# Patient Record
Sex: FEMALE | Race: WHITE | NOT HISPANIC OR LATINO | Employment: OTHER | URBAN - METROPOLITAN AREA
[De-identification: names, ages, dates, MRNs, and addresses within clinical notes are randomized per-mention and may not be internally consistent; named-entity substitution may affect disease eponyms.]

---

## 2017-02-24 ENCOUNTER — TRANSCRIBE ORDERS (OUTPATIENT)
Dept: GYNECOLOGIC ONCOLOGY | Facility: CLINIC | Age: 72
End: 2017-02-24

## 2017-02-24 DIAGNOSIS — Z12.31 VISIT FOR SCREENING MAMMOGRAM: Primary | ICD-10-CM

## 2017-02-28 ENCOUNTER — HOSPITAL ENCOUNTER (OUTPATIENT)
Dept: MAMMOGRAPHY | Facility: HOSPITAL | Age: 72
Discharge: HOME | End: 2017-02-28
Attending: OBSTETRICS & GYNECOLOGY | Admitting: OBSTETRICS & GYNECOLOGY

## 2017-02-28 DIAGNOSIS — Z12.31 VISIT FOR SCREENING MAMMOGRAM: ICD-10-CM

## 2017-02-28 PROCEDURE — 77063 BREAST TOMOSYNTHESIS BI: CPT

## 2017-02-28 PROCEDURE — G0202 SCR MAMMO BI INCL CAD: HCPCS

## 2017-02-28 PROCEDURE — G0202 SCR MAMMO BI INCL CAD: HCPCS | Performed by: RADIOLOGY

## 2017-02-28 PROCEDURE — 77063 BREAST TOMOSYNTHESIS BI: CPT | Performed by: RADIOLOGY

## 2017-07-12 ENCOUNTER — OFFICE VISIT (OUTPATIENT)
Dept: GYNECOLOGIC ONCOLOGY | Facility: CLINIC | Age: 72
End: 2017-07-12

## 2017-07-12 VITALS
SYSTOLIC BLOOD PRESSURE: 118 MMHG | RESPIRATION RATE: 18 BRPM | HEIGHT: 67 IN | DIASTOLIC BLOOD PRESSURE: 77 MMHG | WEIGHT: 211 LBS | OXYGEN SATURATION: 95 % | TEMPERATURE: 99.2 F | HEART RATE: 97 BPM | BODY MASS INDEX: 33.12 KG/M2

## 2017-07-12 DIAGNOSIS — Z80.3 FAMILY HISTORY OF BREAST CANCER: ICD-10-CM

## 2017-07-12 DIAGNOSIS — Z01.419 WELL FEMALE EXAM WITH ROUTINE GYNECOLOGICAL EXAM: Primary | ICD-10-CM

## 2017-07-12 DIAGNOSIS — N95.1 MENOPAUSAL STATE: ICD-10-CM

## 2017-07-12 DIAGNOSIS — Z15.01 GENETIC SUSCEPTIBILITY TO MALIGNANT NEOPLASM OF BREAST: ICD-10-CM

## 2017-07-12 PROCEDURE — 99397 PER PM REEVAL EST PAT 65+ YR: CPT | Performed by: NURSE PRACTITIONER

## 2017-07-12 RX ORDER — CELECOXIB 200 MG/1
CAPSULE ORAL
Refills: 3 | COMMUNITY
Start: 2017-05-08 | End: 2023-01-19

## 2017-07-12 RX ORDER — CYCLOBENZAPRINE HCL 10 MG
10 TABLET ORAL 3 TIMES DAILY PRN
Refills: 3 | COMMUNITY
Start: 2017-06-22

## 2017-07-12 NOTE — PROGRESS NOTES
GYN ONCOLOGY ANNUAL WELL WOMAN VISIT      Blanca Sparrow  [id number]  1945      Chief Complaint: Gynecologic Exam (here with no complaints)        History of present illness:  Blanca Sparrow is a 71 y.o. year old female who is here today for an annual exam. She has a strong family history of breast cancer with a lifetime risk of developing breast cancer estimated to be 26%. She is feeling well today. Her bowels and bladder are working well. She has had no vaginal bleeding or pelvic pain. She is completing monthly self breast exams and has no new breast complaints today. Her current breast screening regimen includes yearly mammograms done 2017 and yearly screening breast MRI done 2016.  She would like to have repeat screening MRI done this month.  She denies changes and personal or family health history.  Her colonoscopy was done 4-5 years ago with recommendation for 10 year follow-up.  She had bone density done 2016 that showed normal bone density.  She continues to take daily supplement calcium and vitamin D.  She is still taking Evista, has been on this for now 9 years.  She notes that her primary care physician plans to discuss the option of stopping this medication at her next visit.    Cancer History:    No history exists.       Obstetric History:  OB History      Para Term  AB TAB SAB Ectopic Multiple Living    1 1 1                Menstrual History:     No LMP recorded. Patient has had a hysterectomy.          Past Medical History:   Diagnosis Date   • Arthritis    • Hypertension        Past Surgical History:   Procedure Laterality Date   • ACHILLES TENDON SURGERY     • BREAST BIOPSY Right     BENIGN CORE BX, PT UNSURE OF DATE   • HYSTERECTOMY     • OTHER SURGICAL HISTORY      APPEY   • SKIN CANCER EXCISION     • TONSILLECTOMY     • TOTAL HIP ARTHROPLASTY REVISION Right    • TOTAL KNEE ARTHROPLASTY Left        MEDICATIONS: The current medication list was reviewed and  "reconciled.     Allergies:  has No Known Allergies.    Family History   Problem Relation Age of Onset   • Heart disease Mother    • Osteoarthritis Mother    • Breast cancer Mother 50   • Heart disease Father    • Diabetes Father    • Stroke Father    • Breast cancer Sister 39   • Ovarian cancer Maternal Grandmother        Health Maintenance:  Last mammogram was 2/28/2017. Last colonoscopy was 4-5 years ago, with recommended follow-up in 10 year(s). Last DEXA was 2016. Last pap smear was 2016, results were  normal PAP..Screening breast MRI 7/2016.     Review of Systems   Constitutional: Negative for fatigue, fever and unexpected weight change.   HENT: Negative for congestion, ear pain, hearing loss, sinus pressure and trouble swallowing.    Eyes: Negative for visual disturbance.   Respiratory: Negative for cough, chest tightness, shortness of breath and wheezing.    Cardiovascular: Negative for chest pain, palpitations and leg swelling.   Gastrointestinal: Negative for abdominal distention, abdominal pain, constipation, diarrhea, nausea and vomiting.   Endocrine: Negative for cold intolerance, heat intolerance, polydipsia, polyphagia and polyuria.   Genitourinary: Negative for difficulty urinating, dyspareunia, dysuria, frequency, hematuria, pelvic pain, urgency, vaginal bleeding, vaginal discharge and vaginal pain.   Musculoskeletal: Negative for arthralgias, gait problem, joint swelling and myalgias.   Skin: Negative for color change, pallor and rash.   Neurological: Negative for dizziness, seizures, syncope, weakness, light-headedness, numbness and headaches.   Hematological: Negative for adenopathy. Does not bruise/bleed easily.   Psychiatric/Behavioral: Negative for agitation, confusion, sleep disturbance and suicidal ideas. The patient is not nervous/anxious.        Physical Exam  Vital Signs: /77  Pulse 97  Temp 99.2 °F (37.3 °C) (Temporal Artery )   Resp 18  Ht 66.5\" (168.9 cm)  Wt 211 lb (95.7 kg) "  SpO2 95%  BMI 33.55 kg/m2   General Appearance:  alert, cooperative, no apparent distress and appears stated age   Neurologic/Psychiatric: A&O x 3, gait steady, appropriate affect   HEENT:  Normocephalic, without obvious abnormality, mucous membranes moist   Neck: Supple, symmetrical, trachea midline, no adenopathy;  No thyromegaly, masses, or tenderness   Back:   Symmetric, no curvature, ROM normal, no CVA tenderness   Lungs:   Clear to auscultation bilaterally; respirations regular, even, and unlabored bilaterally   Heart:  Regular rate and rhythm, no murmurs appreciated   Breasts:  Symmetrical, no masses, no lesions and no nipple discharge   Abdomen:   Soft, non-tender, non-distended and no organomegaly   Lymph nodes: No cervical, supraclavicular, inguinal or axillary adenopathy noted   Extremities: Normal, atraumatic; no clubbing, cyanosis, or edema    Skin: No rashes, ulcers, or suspicious lesions noted   Pelvic: External Genitalia  without lesions or skin changes  Vagina  is pale, atrophic.   Vaginal Cuff  Female Vaginal Cuff: smooth, intact and without visible lesions  Uterus  surgically absent  Ovaries  surgically absent bilaterallly and without palpable masses or fullness  Parametria  smooth  Rectovaginal  Female rectovaginal: confirms no masses or bleeding and Hemoccult negative     ECOG Performance Status: 0 - Asymptomatic    Procedure Note:  No notes on file    Assessment and Plan:    Blanca was seen today for gynecologic exam.    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  The patient was instructed to call with vaginal bleeding, discharge, pelvic pain, change in bowel or bladder  function, or any new symptoms for evaluation of her complaints.   The pt will continue her Evista prescribed by her PCP. We discussed that this decreases the risk of breast cancer by up to 50% when taken for 5 years. The patient isn't on her Evista for 9+ years.  We discussed option of stopping medication.  I told her there is no data to support using this medication for risk reduction past 5 years, however there is some new evidence to show the benefit of hormone blockade in women who have had a previous breast cancer diagnosis.  She is going to discuss option further with her primary care doctor at her next visit.  Bone density done 7/2016 showing normal bone density. Pt instructed to continue calcium and vitamin D supplement daily. Repeat bone density in 2019.  Mamm due 2/2018.  Screening breast MRI due this month and ordered at her visit today.   Encouraged her to continue monthly self breast exams.     Return in about 1 year (around 7/12/2018) for Annual Exam.      BIRGIT Gaxiola      Note: Speech recognition transcription software was used to dictate portions of this document.  An attempt at proofreading has been made though minor errors in transcription may still be present.  Please do not hesitate to call our office with any questions.

## 2017-07-20 ENCOUNTER — HOSPITAL ENCOUNTER (OUTPATIENT)
Dept: MRI IMAGING | Facility: HOSPITAL | Age: 72
Discharge: HOME | End: 2017-07-20

## 2017-07-28 ENCOUNTER — HOSPITAL ENCOUNTER (OUTPATIENT)
Dept: MRI IMAGING | Facility: HOSPITAL | Age: 72
Discharge: HOME | End: 2017-07-28
Admitting: NURSE PRACTITIONER

## 2017-07-28 DIAGNOSIS — Z80.3 FAMILY HISTORY OF BREAST CANCER: ICD-10-CM

## 2017-07-28 DIAGNOSIS — Z15.01 GENETIC SUSCEPTIBILITY TO MALIGNANT NEOPLASM OF BREAST: ICD-10-CM

## 2017-07-28 PROCEDURE — 77059 MRI BREAST BILATERAL SCREENING W WO CONTRAST: CPT | Performed by: RADIOLOGY

## 2017-07-28 PROCEDURE — A9577 INJ MULTIHANCE: HCPCS | Performed by: NURSE PRACTITIONER

## 2017-07-28 PROCEDURE — C8908 MRI W/O FOL W/CONT, BREAST,: HCPCS

## 2017-07-28 PROCEDURE — 0159T PR BREAST MRI, COMPUTER AIDED DETECTION: CPT | Performed by: RADIOLOGY

## 2017-07-28 PROCEDURE — 0 GADOBENATE DIMEGLUMINE 529 MG/ML SOLUTION: Performed by: NURSE PRACTITIONER

## 2017-07-28 RX ADMIN — GADOBENATE DIMEGLUMINE 10 ML: 529 INJECTION, SOLUTION INTRAVENOUS at 09:30

## 2017-08-01 ENCOUNTER — TELEPHONE (OUTPATIENT)
Dept: MRI IMAGING | Facility: HOSPITAL | Age: 72
End: 2017-08-01

## 2018-03-09 ENCOUNTER — TRANSCRIBE ORDERS (OUTPATIENT)
Dept: ADMINISTRATIVE | Facility: HOSPITAL | Age: 73
End: 2018-03-09

## 2018-03-09 ENCOUNTER — HOSPITAL ENCOUNTER (OUTPATIENT)
Dept: GENERAL RADIOLOGY | Facility: HOSPITAL | Age: 73
Discharge: HOME | End: 2018-03-09
Admitting: PHYSICIAN ASSISTANT

## 2018-03-09 DIAGNOSIS — J40 BRONCHITIS: Primary | ICD-10-CM

## 2018-03-09 DIAGNOSIS — J04.0 LARYNGITIS: ICD-10-CM

## 2018-03-09 PROCEDURE — 71046 X-RAY EXAM CHEST 2 VIEWS: CPT

## 2018-03-14 ENCOUNTER — TRANSCRIBE ORDERS (OUTPATIENT)
Dept: ADMINISTRATIVE | Facility: HOSPITAL | Age: 73
End: 2018-03-14

## 2018-03-14 DIAGNOSIS — Z12.31 VISIT FOR SCREENING MAMMOGRAM: Primary | ICD-10-CM

## 2018-03-19 ENCOUNTER — TRANSCRIBE ORDERS (OUTPATIENT)
Dept: ADMINISTRATIVE | Facility: HOSPITAL | Age: 73
End: 2018-03-19

## 2018-03-19 DIAGNOSIS — R49.0 HOARSENESS OF VOICE: ICD-10-CM

## 2018-03-19 DIAGNOSIS — R93.89 ABNORMAL CHEST X-RAY: ICD-10-CM

## 2018-03-19 DIAGNOSIS — Z87.891 PERSONAL HISTORY OF TOBACCO USE, PRESENTING HAZARDS TO HEALTH: Primary | ICD-10-CM

## 2018-03-19 DIAGNOSIS — R05.3 CHRONIC COUGH: ICD-10-CM

## 2018-03-20 ENCOUNTER — HOSPITAL ENCOUNTER (OUTPATIENT)
Dept: MAMMOGRAPHY | Facility: HOSPITAL | Age: 73
Discharge: HOME | End: 2018-03-20
Attending: INTERNAL MEDICINE | Admitting: INTERNAL MEDICINE

## 2018-03-20 DIAGNOSIS — Z12.31 VISIT FOR SCREENING MAMMOGRAM: ICD-10-CM

## 2018-03-20 PROCEDURE — 77063 BREAST TOMOSYNTHESIS BI: CPT

## 2018-03-20 PROCEDURE — 77067 SCR MAMMO BI INCL CAD: CPT | Performed by: RADIOLOGY

## 2018-03-20 PROCEDURE — 77067 SCR MAMMO BI INCL CAD: CPT

## 2018-03-20 PROCEDURE — 77063 BREAST TOMOSYNTHESIS BI: CPT | Performed by: RADIOLOGY

## 2018-03-22 ENCOUNTER — HOSPITAL ENCOUNTER (OUTPATIENT)
Dept: CT IMAGING | Facility: HOSPITAL | Age: 73
Discharge: HOME | End: 2018-03-22
Attending: INTERNAL MEDICINE | Admitting: INTERNAL MEDICINE

## 2018-03-22 DIAGNOSIS — Z87.891 PERSONAL HISTORY OF TOBACCO USE, PRESENTING HAZARDS TO HEALTH: ICD-10-CM

## 2018-03-22 DIAGNOSIS — R05.3 CHRONIC COUGH: ICD-10-CM

## 2018-03-22 DIAGNOSIS — R49.0 HOARSENESS OF VOICE: ICD-10-CM

## 2018-03-22 DIAGNOSIS — R93.89 ABNORMAL CHEST X-RAY: ICD-10-CM

## 2018-03-22 LAB — CREAT BLDA-MCNC: 1.3 MG/DL (ref 0.6–1.3)

## 2018-03-22 PROCEDURE — 82565 ASSAY OF CREATININE: CPT

## 2018-03-22 PROCEDURE — 70491 CT SOFT TISSUE NECK W/DYE: CPT

## 2018-03-22 PROCEDURE — 71260 CT THORAX DX C+: CPT

## 2018-03-22 PROCEDURE — 25010000002 IOPAMIDOL 61 % SOLUTION: Performed by: INTERNAL MEDICINE

## 2018-03-22 RX ADMIN — IOPAMIDOL 70 ML: 612 INJECTION, SOLUTION INTRAVENOUS at 16:00

## 2018-03-30 ENCOUNTER — TRANSCRIBE ORDERS (OUTPATIENT)
Dept: ADMINISTRATIVE | Facility: HOSPITAL | Age: 73
End: 2018-03-30

## 2018-03-30 DIAGNOSIS — Z87.891 PERSONAL HISTORY OF TOBACCO USE, PRESENTING HAZARDS TO HEALTH: ICD-10-CM

## 2018-03-30 DIAGNOSIS — I71.20 THORACIC AORTIC ANEURYSM WITHOUT RUPTURE (HCC): Primary | ICD-10-CM

## 2018-04-02 ENCOUNTER — HOSPITAL ENCOUNTER (OUTPATIENT)
Dept: ULTRASOUND IMAGING | Facility: HOSPITAL | Age: 73
Discharge: HOME | End: 2018-04-02
Attending: INTERNAL MEDICINE | Admitting: INTERNAL MEDICINE

## 2018-04-02 DIAGNOSIS — I71.20 THORACIC AORTIC ANEURYSM WITHOUT RUPTURE (HCC): ICD-10-CM

## 2018-04-02 DIAGNOSIS — Z87.891 PERSONAL HISTORY OF TOBACCO USE, PRESENTING HAZARDS TO HEALTH: ICD-10-CM

## 2018-04-02 PROCEDURE — 76775 US EXAM ABDO BACK WALL LIM: CPT

## 2018-04-23 ENCOUNTER — OFFICE VISIT (OUTPATIENT)
Dept: CARDIAC SURGERY | Facility: CLINIC | Age: 73
End: 2018-04-23

## 2018-04-23 VITALS
DIASTOLIC BLOOD PRESSURE: 86 MMHG | BODY MASS INDEX: 35.03 KG/M2 | TEMPERATURE: 97.6 F | SYSTOLIC BLOOD PRESSURE: 119 MMHG | HEART RATE: 101 BPM | WEIGHT: 223.2 LBS | OXYGEN SATURATION: 98 % | HEIGHT: 67 IN

## 2018-04-23 DIAGNOSIS — I71.20 THORACIC AORTIC ANEURYSM WITHOUT RUPTURE (HCC): Primary | ICD-10-CM

## 2018-04-23 PROCEDURE — 99202 OFFICE O/P NEW SF 15 MIN: CPT | Performed by: THORACIC SURGERY (CARDIOTHORACIC VASCULAR SURGERY)

## 2018-04-23 RX ORDER — PRAVASTATIN SODIUM 40 MG
80 TABLET ORAL DAILY
COMMUNITY

## 2018-04-23 RX ORDER — RANITIDINE 300 MG/1
300 TABLET ORAL NIGHTLY
COMMUNITY
End: 2023-01-19

## 2018-04-23 NOTE — PROGRESS NOTES
04/23/2018  Patient Information  Blanca Sparrow                                                                                          [address] [place] [address]   1945  'PCP/Referring Physician'  Josiah Wagner MD  [phone]  Josiah Wagner MD  [phone]  Chief Complaint   Patient presents with   • Consult     New patient per Dr. Wagner for TAA. Patient has no complaints today    • Thoracic Aneurysm       History of Present Illness:  This patient was referred to me to evaluate a 4.2 cm ascending aortic aneurysm.  She has no known associated aortic valvular abnormalities.  She has had no recent back, chest and/or abdominal pain.  As far as she knows, she has not seen a cardiologist and had an echocardiogram to assess for aortic valve function.      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   • Family history of breast cancer   • Menopausal state   • Edema of left lower extremity   • Thoracic aortic aneurysm without rupture     Past Medical History:   Diagnosis Date   • Arthritis    • Hypertension      Past Surgical History:   Procedure Laterality Date   • ACHILLES TENDON SURGERY     • BREAST BIOPSY Right     BENIGN CORE BX, PT UNSURE OF DATE   • HYSTERECTOMY     • OOPHORECTOMY     • OTHER SURGICAL HISTORY      APPEY   • SKIN CANCER EXCISION     • TONSILLECTOMY     • TOTAL HIP ARTHROPLASTY REVISION Right    • TOTAL KNEE ARTHROPLASTY Left        Current Outpatient Prescriptions:   •  Calcium Carbonate-Vitamin D (CALCIUM + D PO), Take  by mouth daily., Disp: , Rfl:   •  celecoxib (CeleBREX) 200 MG capsule, TAKE 1 CAPSULE EVERY DAY, Disp: , Rfl: 3  •  Cholecalciferol (VITAMIN D-3 PO), Take 5,000 Units by mouth daily., Disp: , Rfl:   •  cyclobenzaprine (FLEXERIL) 10 MG tablet, TK 1 T PO TID, Disp: , Rfl: 3  •  montelukast (SINGULAIR) 10 MG tablet, Take 10 mg by mouth daily., Disp: ,  Rfl:   •  Olmesartan Medoxomil (BENICAR PO), Take  by mouth daily., Disp: , Rfl:   •  omeprazole (PriLOSEC) 40 MG capsule, Take 40 mg by mouth daily., Disp: , Rfl:   •  pravastatin (PRAVACHOL) 40 MG tablet, Take 40 mg by mouth Daily., Disp: , Rfl:   •  raloxifene (EVISTA) 60 MG tablet, Take 60 mg by mouth daily., Disp: , Rfl:   •  raNITIdine (ZANTAC) 300 MG tablet, Take 300 mg by mouth Every Night., Disp: , Rfl:   •  venlafaxine XR (EFFEXOR-XR) 150 MG 24 hr capsule, Take 150 mg by mouth daily., Disp: , Rfl:   •  zolpidem (AMBIEN) 10 MG tablet, Take 10 mg by mouth every night., Disp: , Rfl:   No Known Allergies  Social History     Social History   • Marital status:      Spouse name: N/A   • Number of children: N/A   • Years of education: N/A     Occupational History   • Not on file.     Social History Main Topics   • Smoking status: Former Smoker     Packs/day: 1.00     Types: Cigarettes     Start date: 1968     Quit date: 1983   • Smokeless tobacco: Never Used      Comment: Pt used nicoret since quit smoking    • Alcohol use No   • Drug use: No   • Sexual activity: No     Other Topics Concern   • Not on file     Social History Narrative   • No narrative on file     Family History   Problem Relation Age of Onset   • Heart disease Mother    • Osteoarthritis Mother    • Breast cancer Mother 50   • Heart disease Father    • Diabetes Father    • Stroke Father    • Breast cancer Sister 38   • Ovarian cancer Maternal Grandmother      ?     Review of Systems   Constitution: Positive for fever. Negative for chills, malaise/fatigue, night sweats and weight loss.   HENT: Negative for congestion, hearing loss, odynophagia and sore throat.    Cardiovascular: Positive for dyspnea on exertion. Negative for chest pain, leg swelling, orthopnea and palpitations.   Respiratory: Positive for cough (pt had bronchitis ) and shortness of breath. Negative for hemoptysis.    Endocrine: Negative for cold intolerance, heat intolerance,  "polydipsia, polyphagia and polyuria.   Hematologic/Lymphatic: Does not bruise/bleed easily.   Skin: Negative for itching and rash.   Musculoskeletal: Positive for joint pain (occasionally ). Negative for back pain, joint swelling, muscle cramps, muscle weakness, myalgias and neck pain.   Gastrointestinal: Negative for abdominal pain, constipation, diarrhea, hematemesis, hematochezia, melena, nausea and vomiting.   Genitourinary: Negative for dysuria, frequency and hematuria.   Neurological: Positive for light-headedness (when standing up fast ). Negative for dizziness, focal weakness, headaches, loss of balance, numbness, paresthesias and seizures.   Psychiatric/Behavioral: Negative for depression and suicidal ideas. The patient is not nervous/anxious.    Allergic/Immunologic: Negative for environmental allergies and persistent infections.   All other systems reviewed and are negative.    Vitals:    04/23/18 0719   BP: 119/86   BP Location: Right arm   Patient Position: Sitting   Pulse: 101   Temp: 97.6 °F (36.4 °C)   SpO2: 98%   Weight: 101 kg (223 lb 3.2 oz)   Height: 170.2 cm (67\")      Physical Exam   CONSTITUTIONAL: Alert and conversant, Well dressed, Well nourished, No acute distress  EYES: Sclera clean, Anicteric, Pupils equal  ENT: No nasal deviation, Trachea midline  NECK: No neck masses, Supple  LUNGS: No wheezing, Cough, non-congested  HEART: No rubs, No murmurs  GASTROINTESTINAL: Soft, non-distended, No masses, Non tender  to palpation, normal bowel sounds  NEURO: No motor deficits, No sensory deficits, Cranial Nerves 2 through 12 grossly intact  PSYCHIATRIC: Oriented to person, place and time, No memory deficits, Mood appropriate  VASCULAR: No carotid bruits, Femoral pulses palpable and symmetric  MUSKULOSKELETAL: No extremity trauma or extremity asymmetry    Labs/Imaging:   I have obtained and reviewed the medical records that include the CT scan demonstrating the 4.2 cm ascending " aorta.    Assessment/Plan:  This is a patient with a 4.2 cm ascending aortic aneurysm with no clinical evidence of aortic valvular abnormalities.  At this time, surgical intervention is only required at 5.5 cm or if there is associated valvular abnormalities.  I have discussed this with her in detail.  I have made arrangements for a CT scan to be done in one year for surveillance of this aneurysm. I advised her of the risk of contrast reaction and nephrotoxicity.  I have also recommended that she obtain an echocardiogram as a baseline and to assess for any associated aortic valvular abnormalities, which commonly occur with aneurysms of the ascending aorta.  I will leave this to Dr. Josiah Wagner's choice as to the cardiologist to send her for her echocardiogram and she states she will discuss that with him also.      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    04/23/2018    CC:   DANITA Burton, Medical Records, editing for Larry Stringer M.D.    ILarry MD, have read and agree with the editing done by Rubén Gil.

## 2018-08-08 ENCOUNTER — OFFICE VISIT (OUTPATIENT)
Dept: GYNECOLOGIC ONCOLOGY | Facility: CLINIC | Age: 73
End: 2018-08-08

## 2018-08-08 VITALS
HEIGHT: 67 IN | WEIGHT: 224 LBS | TEMPERATURE: 99.1 F | BODY MASS INDEX: 35.16 KG/M2 | RESPIRATION RATE: 20 BRPM | SYSTOLIC BLOOD PRESSURE: 145 MMHG | HEART RATE: 88 BPM | OXYGEN SATURATION: 95 % | DIASTOLIC BLOOD PRESSURE: 81 MMHG

## 2018-08-08 DIAGNOSIS — Z15.01 GENETIC SUSCEPTIBILITY TO MALIGNANT NEOPLASM OF BREAST: Primary | ICD-10-CM

## 2018-08-08 DIAGNOSIS — Z80.3 FAMILY HISTORY OF BREAST CANCER: ICD-10-CM

## 2018-08-08 PROCEDURE — 99213 OFFICE O/P EST LOW 20 MIN: CPT | Performed by: NURSE PRACTITIONER

## 2018-08-10 ENCOUNTER — TRANSCRIBE ORDERS (OUTPATIENT)
Dept: ADMINISTRATIVE | Facility: HOSPITAL | Age: 73
End: 2018-08-10

## 2018-08-10 DIAGNOSIS — I71.40 ABDOMINAL AORTIC ANEURYSM (AAA) WITHOUT RUPTURE (HCC): Primary | ICD-10-CM

## 2018-08-15 NOTE — PROGRESS NOTES
GYN ONCOLOGY HIGH RISK CLINIC    Blanca Sparrow  [id number]  1945    Chief Complaint:   Chief Complaint   Patient presents with   • Follow-up     here for high risk visit. no breast complaints        HISTORY OF PRESENT ILLNESS:    Blanca Sparrow is a 73 y.o. year old female here today for her high risk visit. She has a strong family history of breast cancer with a lifetime risk of developing breast cancer estimated to be 26%. She is completing monthly self breast exams and has no new breast complaints today. Her current breast screening regimen includes yearly mammograms done 3/20/2018 and yearly screening breast MRI done 7/28/2017. She is due to repeat high risk MRI now.     Since her last visit, patient was diagnosed with AAA. This was found incidentally upon CT for evaluation of an upper respiratory infection. She has been evaluated by cardiology and vascular surgery who feel area is small and q1 year imaging has been recommended. She will be seeing Dr. Stringer for her follow-ups. She is asymptomatic and feeling generally well.     Past Medical History:   Diagnosis Date   • Aortic aneurysm, abdominal (CMS/HCC)    • Arthritis    • Hypertension        Past Surgical History:   Procedure Laterality Date   • ACHILLES TENDON SURGERY     • BREAST BIOPSY Right     BENIGN CORE BX, PT UNSURE OF DATE   • HYSTERECTOMY     • OOPHORECTOMY     • OTHER SURGICAL HISTORY      APPEY   • SKIN CANCER EXCISION     • TONSILLECTOMY     • TOTAL HIP ARTHROPLASTY REVISION Right    • TOTAL KNEE ARTHROPLASTY Left        Family History   Problem Relation Age of Onset   • Heart disease Mother    • Osteoarthritis Mother    • Breast cancer Mother 50   • Heart disease Father    • Diabetes Father    • Stroke Father    • Breast cancer Sister 38   • Ovarian cancer Maternal Grandmother         ?       Health Maintenance:    Regular self breast exam: yes  Mammogram: 3/20/2018. History of abnormal mammogram: no  Breast MRI: 7/28/2017. Results:  "negative    Risk Asessment:  26%    Allergies: No Known Allergies    Medications: Medications have been reviewed in the chart and reconciled.     Review of Systems   Constitutional: Negative for appetite change, chills, fatigue, fever and unexpected weight change.   Respiratory: Negative for cough, shortness of breath and wheezing.    Cardiovascular: Negative for chest pain, palpitations and leg swelling.   Gastrointestinal: Negative for abdominal distention, abdominal pain, blood in stool, constipation, diarrhea, nausea and vomiting.   Endocrine: Negative.    Genitourinary: Negative for dyspareunia, dysuria, frequency, genital sores, hematuria, pelvic pain, urgency, vaginal bleeding, vaginal discharge and vaginal pain.   Musculoskeletal: Negative for arthralgias, gait problem and joint swelling.   Neurological: Negative for dizziness, seizures, syncope, weakness, light-headedness, numbness and headaches.   Hematological: Negative for adenopathy.   Psychiatric/Behavioral: Negative.        Physical Exam  Vital Signs: /81   Pulse 88   Temp 99.1 °F (37.3 °C) (Temporal Artery )   Resp 20   Ht 169.5 cm (66.75\")   Wt 102 kg (224 lb)   SpO2 95%   BMI 35.35 kg/m²    General Appearance:  alert, cooperative, no apparent distress and appears stated age   Neurologic/Psychiatric: A&O x 3, gait steady, appropriate affect   HEENT:  Normocephalic, without obvious abnormality, mucous membranes moist   Neck: Supple, symmetrical, trachea midline, no adenopathy;  No thyromegaly, masses, or tenderness   Back:   Symmetric, no curvature, ROM normal, no CVA tenderness   Lungs:   Clear to auscultation bilaterally; respirations regular, even, and unlabored bilaterally   Heart:  Regular rate and rhythm, no murmurs appreciated   Breasts:  Symmetrical, no masses, no lesions and no nipple discharge   Abdomen:   Soft, non-tender, non-distended and no organomegaly   Lymph nodes: No cervical, supraclavicular, inguinal or axillary " adenopathy noted   Extremities: Normal, atraumatic; no clubbing, cyanosis, or edema    Skin: No rashes, ulcers, or suspicious lesions noted   Pelvic: deferred       Assessment and Plan:    Blanca was seen today for follow-up.    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 -- completes, reviewed proper technique and changes to report  Clinical breast exam every 6 months. -- UTD, normal today.   Annual mammogram starting at age 40, or 10 years prior to the earliest diagnosis in the family. -- UTD, repeat due in 3/2019  Annual MRI starting at age 40, or 10 years prior to the earliest diagnosis in the family. -- due now, ordered today.   Consider chemoprevention for breast cancer risk reduction--remains on Evista x 10 years, prescribed by PCP. I told her there is no data to support using this medication for risk reduction past 5 years, however there is some new evidence to show the benefit of hormone blockade in women who have had a previous breast cancer diagnosis.  She is going to discuss option of discontinuing further with her primary care doctor at her next visit.      Total Time Spent: 20 minutes    Return to clinic in 1 year for High Risk visit.      BIRGIT Foley        Note: Speech recognition transcription software was used to dictate portions of this document.  An attempt at proofreading has been made though minor errors in transcription may still be present.  Please do not hesitate to call our office with any questions.

## 2018-08-21 ENCOUNTER — HOSPITAL ENCOUNTER (OUTPATIENT)
Dept: CARDIOLOGY | Facility: HOSPITAL | Age: 73
Discharge: HOME | End: 2018-08-21
Attending: INTERNAL MEDICINE | Admitting: INTERNAL MEDICINE

## 2018-08-21 VITALS — WEIGHT: 224 LBS | BODY MASS INDEX: 35.16 KG/M2 | HEIGHT: 67 IN

## 2018-08-21 DIAGNOSIS — I71.40 ABDOMINAL AORTIC ANEURYSM (AAA) WITHOUT RUPTURE (HCC): ICD-10-CM

## 2018-08-21 LAB
AORTIC ARCH: 3.5 CM
ASCENDING AORTA: 4.4 CM
BH CV ECHO MEAS - AO ROOT AREA (BSA CORRECTED): 1.8
BH CV ECHO MEAS - AO ROOT AREA: 11.9 CM^2
BH CV ECHO MEAS - AO ROOT DIAM: 3.9 CM
BH CV ECHO MEAS - ASC AORTA: 4.3 CM
BH CV ECHO MEAS - BSA(HAYCOCK): 2.2 M^2
BH CV ECHO MEAS - BSA: 2.1 M^2
BH CV ECHO MEAS - BZI_BMI: 35.1 KILOGRAMS/M^2
BH CV ECHO MEAS - BZI_METRIC_HEIGHT: 170.2 CM
BH CV ECHO MEAS - BZI_METRIC_WEIGHT: 101.6 KG
BH CV ECHO MEAS - EDV(CUBED): 119.1 ML
BH CV ECHO MEAS - EDV(MOD-SP2): 91 ML
BH CV ECHO MEAS - EDV(MOD-SP4): 100 ML
BH CV ECHO MEAS - EDV(TEICH): 113.9 ML
BH CV ECHO MEAS - EF(CUBED): 75.7 %
BH CV ECHO MEAS - EF(MOD-BP): 66 %
BH CV ECHO MEAS - EF(MOD-SP2): 67 %
BH CV ECHO MEAS - EF(MOD-SP4): 67 %
BH CV ECHO MEAS - EF(TEICH): 67.5 %
BH CV ECHO MEAS - ESV(CUBED): 28.9 ML
BH CV ECHO MEAS - ESV(MOD-SP2): 30 ML
BH CV ECHO MEAS - ESV(MOD-SP4): 33 ML
BH CV ECHO MEAS - ESV(TEICH): 37 ML
BH CV ECHO MEAS - FS: 37.6 %
BH CV ECHO MEAS - IVS/LVPW: 0.98
BH CV ECHO MEAS - IVSD: 1 CM
BH CV ECHO MEAS - LA DIMENSION: 3.8 CM
BH CV ECHO MEAS - LA/AO: 0.97
BH CV ECHO MEAS - LAT PEAK E' VEL: 7.9 CM/SEC
BH CV ECHO MEAS - LV DIASTOLIC VOL/BSA (35-75): 47.1 ML/M^2
BH CV ECHO MEAS - LV MASS(C)D: 186.9 GRAMS
BH CV ECHO MEAS - LV MASS(C)DI: 88.1 GRAMS/M^2
BH CV ECHO MEAS - LV SYSTOLIC VOL/BSA (12-30): 15.6 ML/M^2
BH CV ECHO MEAS - LVIDD: 4.9 CM
BH CV ECHO MEAS - LVIDS: 3.1 CM
BH CV ECHO MEAS - LVLD AP2: 8.7 CM
BH CV ECHO MEAS - LVLD AP4: 8.8 CM
BH CV ECHO MEAS - LVLS AP2: 6.4 CM
BH CV ECHO MEAS - LVLS AP4: 6.7 CM
BH CV ECHO MEAS - LVOT AREA (M): 4.2 CM^2
BH CV ECHO MEAS - LVOT AREA: 4.2 CM^2
BH CV ECHO MEAS - LVOT DIAM: 2.3 CM
BH CV ECHO MEAS - LVPWD: 1.1 CM
BH CV ECHO MEAS - MED PEAK E' VEL: 7.68 CM/SEC
BH CV ECHO MEAS - MV A MAX VEL: 71 CM/SEC
BH CV ECHO MEAS - MV DEC TIME: 0.24 SEC
BH CV ECHO MEAS - MV E MAX VEL: 49.8 CM/SEC
BH CV ECHO MEAS - MV E/A: 0.7
BH CV ECHO MEAS - PA ACC SLOPE: 1636 CM/SEC^2
BH CV ECHO MEAS - PA ACC TIME: 0.07 SEC
BH CV ECHO MEAS - PA PR(ACCEL): 47.5 MMHG
BH CV ECHO MEAS - PULM A REVS VEL: 29.9 CM/SEC
BH CV ECHO MEAS - PULM DIAS VEL: 33.3 CM/SEC
BH CV ECHO MEAS - PULM S/D: 1.8
BH CV ECHO MEAS - PULM SYS VEL: 58.9 CM/SEC
BH CV ECHO MEAS - RAP SYSTOLE: 3 MMHG
BH CV ECHO MEAS - RVSP: 25 MMHG
BH CV ECHO MEAS - SI(CUBED): 42.5 ML/M^2
BH CV ECHO MEAS - SI(MOD-SP2): 28.7 ML/M^2
BH CV ECHO MEAS - SI(MOD-SP4): 31.6 ML/M^2
BH CV ECHO MEAS - SI(TEICH): 36.2 ML/M^2
BH CV ECHO MEAS - SV(CUBED): 90.2 ML
BH CV ECHO MEAS - SV(MOD-SP2): 61 ML
BH CV ECHO MEAS - SV(MOD-SP4): 67 ML
BH CV ECHO MEAS - SV(TEICH): 76.9 ML
BH CV ECHO MEAS - TAPSE (>1.6): 3.5 CM2
BH CV ECHO MEAS - TR MAX PG: 22
BH CV ECHO MEAS - TR MAX VEL: 236 CM/SEC
BH CV ECHO MEASUREMENTS AVERAGE E/E' RATIO: 6.39
BH CV VAS BP LEFT ARM: NORMAL MMHG
BH CV XLRA - RV BASE: 4.8 CM
BH CV XLRA - RV LENGTH: 6.9 CM
BH CV XLRA - RV MID: 4.1 CM
LEFT ATRIUM VOLUME INDEX: 35.4 ML/M2
LV EF 2D ECHO EST: 66 %

## 2018-08-21 PROCEDURE — 93306 TTE W/DOPPLER COMPLETE: CPT | Performed by: INTERNAL MEDICINE

## 2018-08-21 PROCEDURE — 93306 TTE W/DOPPLER COMPLETE: CPT

## 2018-09-24 ENCOUNTER — HOSPITAL ENCOUNTER (OUTPATIENT)
Dept: MRI IMAGING | Facility: HOSPITAL | Age: 73
Discharge: HOME | End: 2018-09-24
Admitting: NURSE PRACTITIONER

## 2018-09-24 DIAGNOSIS — Z80.3 FAMILY HISTORY OF BREAST CANCER: ICD-10-CM

## 2018-09-24 DIAGNOSIS — Z15.01 GENETIC SUSCEPTIBILITY TO MALIGNANT NEOPLASM OF BREAST: ICD-10-CM

## 2018-09-24 LAB — CREAT BLDA-MCNC: 1 MG/DL (ref 0.6–1.3)

## 2018-09-24 PROCEDURE — 0 GADOBENATE DIMEGLUMINE 529 MG/ML SOLUTION: Performed by: NURSE PRACTITIONER

## 2018-09-24 PROCEDURE — 0159T PR BREAST MRI, COMPUTER AIDED DETECTION: CPT | Performed by: RADIOLOGY

## 2018-09-24 PROCEDURE — C8908 MRI W/O FOL W/CONT, BREAST,: HCPCS

## 2018-09-24 PROCEDURE — 77059 MRI BREAST BILATERAL SCREENING W WO CONTRAST: CPT | Performed by: RADIOLOGY

## 2018-09-24 PROCEDURE — A9577 INJ MULTIHANCE: HCPCS | Performed by: NURSE PRACTITIONER

## 2018-09-24 PROCEDURE — 82565 ASSAY OF CREATININE: CPT

## 2018-09-24 RX ADMIN — GADOBENATE DIMEGLUMINE 20 ML: 529 INJECTION, SOLUTION INTRAVENOUS at 14:06

## 2018-09-25 ENCOUNTER — TELEPHONE (OUTPATIENT)
Dept: MRI IMAGING | Facility: HOSPITAL | Age: 73
End: 2018-09-25

## 2018-09-25 ENCOUNTER — HOSPITAL ENCOUNTER (OUTPATIENT)
Dept: MRI IMAGING | Facility: HOSPITAL | Age: 73
Discharge: HOME | End: 2018-09-25

## 2018-09-25 NOTE — TELEPHONE ENCOUNTER
Called pt with Breast MRI results. Recommended yearly high risk screening. Pt expressed understanding and will call with any further questions or concerns.

## 2019-04-11 ENCOUNTER — TRANSCRIBE ORDERS (OUTPATIENT)
Dept: ADMINISTRATIVE | Facility: HOSPITAL | Age: 74
End: 2019-04-11

## 2019-04-11 DIAGNOSIS — I71.20 THORACIC AORTIC ANEURYSM WITHOUT RUPTURE (HCC): Primary | ICD-10-CM

## 2019-04-23 ENCOUNTER — HOSPITAL ENCOUNTER (OUTPATIENT)
Dept: CT IMAGING | Facility: HOSPITAL | Age: 74
Discharge: HOME | End: 2019-04-23
Admitting: INTERNAL MEDICINE

## 2019-04-23 DIAGNOSIS — I71.20 THORACIC AORTIC ANEURYSM WITHOUT RUPTURE (HCC): ICD-10-CM

## 2019-04-23 LAB — CREAT BLDA-MCNC: 0.9 MG/DL (ref 0.6–1.3)

## 2019-04-23 PROCEDURE — 82565 ASSAY OF CREATININE: CPT

## 2019-04-23 PROCEDURE — 0 IOPAMIDOL PER 1 ML: Performed by: INTERNAL MEDICINE

## 2019-04-23 PROCEDURE — 71275 CT ANGIOGRAPHY CHEST: CPT

## 2019-04-23 PROCEDURE — 74174 CTA ABD&PLVS W/CONTRAST: CPT

## 2019-04-23 RX ADMIN — IOPAMIDOL 95 ML: 755 INJECTION, SOLUTION INTRAVENOUS at 11:53

## 2019-06-03 ENCOUNTER — TELEPHONE (OUTPATIENT)
Dept: CARDIAC SURGERY | Facility: CLINIC | Age: 74
End: 2019-06-03

## 2019-07-15 ENCOUNTER — OFFICE VISIT (OUTPATIENT)
Dept: CARDIAC SURGERY | Facility: CLINIC | Age: 74
End: 2019-07-15

## 2019-07-15 VITALS
HEART RATE: 83 BPM | BODY MASS INDEX: 36.32 KG/M2 | OXYGEN SATURATION: 98 % | DIASTOLIC BLOOD PRESSURE: 72 MMHG | SYSTOLIC BLOOD PRESSURE: 134 MMHG | HEIGHT: 67 IN | WEIGHT: 231.4 LBS | TEMPERATURE: 98.9 F

## 2019-07-15 DIAGNOSIS — I71.20 THORACIC AORTIC ANEURYSM WITHOUT RUPTURE (HCC): Primary | ICD-10-CM

## 2019-07-15 PROCEDURE — 99213 OFFICE O/P EST LOW 20 MIN: CPT | Performed by: THORACIC SURGERY (CARDIOTHORACIC VASCULAR SURGERY)

## 2019-07-15 NOTE — PROGRESS NOTES
07/15/2019  Patient Information  Blanca Sparrow                                                                                          [address]  [place] [address]   1945  'PCP/Referring Physician'  Josiah Wagner MD  [phone]  No ref. provider found    Chief Complaint   Patient presents with   • Follow-up     1 yr f/u with CTA CHEST   • Thoracic Aneurysm       History of Present Illness:   This is a patient who I am following for a small ascending aortic aneurysm.  I initially saw her one year ago and the aneurysm measured 4.2 cm in maximum dimension.  She has no associated valvular abnormalities but she had arranged for follow-up with her cardiologist for echocardiogram through Dr. Josiah Wagner, her internal medicine physician.  At this time, she returns to see me.  She has no back, flank or abdominal or intrascapular pain.  I have reviewed her most recent CT scan and there has been absolutely no change in the size of her 4.2 cm a sending aortic aneurysm.  She has no anginal symptoms.      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   • Family history of breast cancer   • Menopausal state   • Edema of left lower extremity   • Thoracic aortic aneurysm without rupture (CMS/HCC)     Past Medical History:   Diagnosis Date   • Aortic aneurysm, abdominal (CMS/HCC)    • Arthritis    • Hypertension      Past Surgical History:   Procedure Laterality Date   • ACHILLES TENDON SURGERY     • BREAST BIOPSY Right     BENIGN CORE BX, PT UNSURE OF DATE   • HYSTERECTOMY     • OOPHORECTOMY     • OTHER SURGICAL HISTORY      APPEY   • SKIN CANCER EXCISION     • TONSILLECTOMY     • TOTAL HIP ARTHROPLASTY REVISION Right    • TOTAL KNEE ARTHROPLASTY Left        Current Outpatient Medications:   •  Calcium Carbonate-Vitamin D (CALCIUM + D PO), Take  by mouth daily., Disp: , Rfl:   •  celecoxib (CeleBREX) 200  MG capsule, TAKE 1 CAPSULE EVERY DAY, Disp: , Rfl: 3  •  Cholecalciferol (VITAMIN D-3 PO), Take 5,000 Units by mouth daily., Disp: , Rfl:   •  cyclobenzaprine (FLEXERIL) 10 MG tablet, TK 1 T PO TID, Disp: , Rfl: 3  •  montelukast (SINGULAIR) 10 MG tablet, Take 10 mg by mouth daily., Disp: , Rfl:   •  Olmesartan Medoxomil (BENICAR PO), Take  by mouth daily., Disp: , Rfl:   •  omeprazole (PriLOSEC) 40 MG capsule, Take 40 mg by mouth daily., Disp: , Rfl:   •  pravastatin (PRAVACHOL) 40 MG tablet, Take 40 mg by mouth Daily., Disp: , Rfl:   •  raloxifene (EVISTA) 60 MG tablet, Take 60 mg by mouth daily., Disp: , Rfl:   •  raNITIdine (ZANTAC) 300 MG tablet, Take 300 mg by mouth Every Night., Disp: , Rfl:   •  venlafaxine XR (EFFEXOR-XR) 150 MG 24 hr capsule, Take 150 mg by mouth daily., Disp: , Rfl:   •  zolpidem (AMBIEN) 10 MG tablet, Take 10 mg by mouth every night., Disp: , Rfl:   No Known Allergies  Social History     Socioeconomic History   • Marital status:      Spouse name: Not on file   • Number of children: 1   • Years of education: Not on file   • Highest education level: Not on file   Occupational History     Employer: RETIRED   Tobacco Use   • Smoking status: Former Smoker     Packs/day: 1.00     Types: Cigarettes     Start date:      Last attempt to quit:      Years since quittin.5   • Smokeless tobacco: Never Used   • Tobacco comment: Pt used nicoret since quit smoking    Substance and Sexual Activity   • Alcohol use: No   • Drug use: No   • Sexual activity: No   Social History Narrative    Lives in [place], [place]     Family History   Problem Relation Age of Onset   • Heart disease Mother    • Osteoarthritis Mother    • Breast cancer Mother 50   • Heart disease Father    • Diabetes Father    • Stroke Father    • Breast cancer Sister 38   • Ovarian cancer Maternal Grandmother         ?     Review of Systems   Constitution: Negative for chills, diaphoresis, fever, weakness, malaise/fatigue,  "night sweats, weight gain and weight loss.   HENT: Negative for congestion, ear pain, hearing loss, nosebleeds and sore throat.    Eyes: Negative for blurred vision and double vision.   Cardiovascular: Positive for dyspnea on exertion. Negative for chest pain, claudication, leg swelling, near-syncope, palpitations and syncope.   Respiratory: Positive for cough and shortness of breath. Negative for hemoptysis and wheezing.    Hematologic/Lymphatic: Does not bruise/bleed easily.   Skin: Negative for itching, poor wound healing and rash.   Musculoskeletal: Negative for arthritis, back pain, falls, joint pain, joint swelling, muscle cramps, muscle weakness and neck pain.   Gastrointestinal: Negative for abdominal pain, constipation, diarrhea, dysphagia, hematochezia, nausea and vomiting.   Genitourinary: Negative for frequency, hematuria, hesitancy, incomplete emptying and urgency.   Neurological: Negative for dizziness, headaches, light-headedness, loss of balance, numbness, seizures and tremors.   Psychiatric/Behavioral: Negative for depression and suicidal ideas. The patient is nervous/anxious.    Allergic/Immunologic: Negative for environmental allergies and HIV exposure.     Vitals:    07/15/19 0902   BP: 134/72   BP Location: Right arm   Patient Position: Sitting   Pulse: 83   Temp: 98.9 °F (37.2 °C)   SpO2: 98%   Weight: 105 kg (231 lb 6.4 oz)   Height: 170.2 cm (67\")      Physical Exam   CONSTITUTIONAL: Alert and conversant, Well dressed, Well nourished, No acute distress  EYES: Sclera clean, Anicteric, Pupils equal  ENT: No nasal deviation, Trachea midline  NECK: No neck masses, Supple  LUNGS: No wheezing, Cough, non-congested  HEART: No rubs, No murmurs  GASTROINTESTINAL: Soft, non-distended, No masses, Non tender  to palpation, normal bowel sounds  NEURO: No motor deficits, No sensory deficits, Cranial Nerves 2 through 12 grossly intact  PSYCHIATRIC: Oriented to person, place and time, No memory deficits, " Mood appropriate  VASCULAR: No carotid bruits, Femoral pulses palpable and symmetric  MUSKULOSKELETAL: No extremity trauma or extremity asymmetry    Labs/Imaging:   I reviewed the CT scan demonstrating a 4.2 cm ascending aortic aneurysm.    Assessment/Plan:   This patient has a 4.2 cm ascending aortic aneurysm without associated valvular abnormalities by history.  This has not changed in 1 year by recent scan and it remains at 4.2 cm.  I have made arrangements for repeat scan to be done in 18 months.  I believe, because of the stability over the last year, we would be safe to wait 18 months before proceeding with an additional scan.  The patient is agreeable to this plan.    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    I, Larry Stringer MD, have read and agree with the editing done by Lisa Ortega, .

## 2019-08-07 ENCOUNTER — OFFICE VISIT (OUTPATIENT)
Dept: GYNECOLOGIC ONCOLOGY | Facility: CLINIC | Age: 74
End: 2019-08-07

## 2019-08-07 VITALS
HEART RATE: 76 BPM | RESPIRATION RATE: 16 BRPM | DIASTOLIC BLOOD PRESSURE: 87 MMHG | SYSTOLIC BLOOD PRESSURE: 142 MMHG | WEIGHT: 233.3 LBS | BODY MASS INDEX: 36.62 KG/M2 | HEIGHT: 67 IN | OXYGEN SATURATION: 97 % | TEMPERATURE: 97.5 F

## 2019-08-07 DIAGNOSIS — Z80.3 FAMILY HISTORY OF BREAST CANCER: ICD-10-CM

## 2019-08-07 DIAGNOSIS — Z15.01 GENETIC SUSCEPTIBILITY TO MALIGNANT NEOPLASM OF BREAST: Primary | ICD-10-CM

## 2019-08-07 PROCEDURE — 99213 OFFICE O/P EST LOW 20 MIN: CPT | Performed by: NURSE PRACTITIONER

## 2019-08-07 NOTE — PROGRESS NOTES
GYN ONCOLOGY HIGH RISK CLINIC    Blanca Sparrow  [id number]  1945    Chief Complaint:   Chief Complaint   Patient presents with   • Follow-up     high risk breast       HISTORY OF PRESENT ILLNESS:    Blanca Sparrow is a 74 y.o. year old female here today for her high risk visit. She has a strong family history of breast cancer with a lifetime risk of developing breast cancer estimated to be 26%. She is completing monthly self breast exams and has no new breast complaints today. Her current breast screening regimen includes yearly mammograms done 3/20/2018 and yearly screening breast MRI done 9/24/2018. She is overdue for screening mammogram and will have this study done instead of her MRI this year, MRI will be completed this spring instead of mammogram.         Past Medical History:   Diagnosis Date   • Aortic aneurysm, abdominal (CMS/HCC)    • Arthritis    • Hypertension        Past Surgical History:   Procedure Laterality Date   • ACHILLES TENDON SURGERY     • BREAST BIOPSY Right     BENIGN CORE BX, PT UNSURE OF DATE   • HYSTERECTOMY     • OOPHORECTOMY     • OTHER SURGICAL HISTORY      APPEY   • SKIN CANCER EXCISION     • TONSILLECTOMY     • TOTAL HIP ARTHROPLASTY REVISION Right    • TOTAL KNEE ARTHROPLASTY Left        Family History   Problem Relation Age of Onset   • Heart disease Mother    • Osteoarthritis Mother    • Breast cancer Mother 50   • Heart disease Father    • Diabetes Father    • Stroke Father    • Breast cancer Sister 38   • Ovarian cancer Maternal Grandmother         ?       Health Maintenance:    Regular self breast exam: yes  Mammogram: 3/20/2018. History of abnormal mammogram: no  Breast MRI: 9/24/2018. Results: negative    Risk Asessment: 26%    Allergies: No Known Allergies    Medications: Medications have been reviewed in the chart and reconciled.     Review of Systems   Constitutional: Negative for appetite change, chills, fatigue, fever and unexpected weight change.   Respiratory:  "Negative for cough, shortness of breath and wheezing.    Cardiovascular: Negative for chest pain, palpitations and leg swelling.   Gastrointestinal: Negative for abdominal distention, abdominal pain, blood in stool, constipation, diarrhea, nausea and vomiting.   Endocrine: Negative.    Genitourinary: Negative for dyspareunia, dysuria, frequency, genital sores, hematuria, pelvic pain, urgency, vaginal bleeding, vaginal discharge and vaginal pain.   Musculoskeletal: Negative for arthralgias, gait problem and joint swelling.   Neurological: Negative for dizziness, seizures, syncope, weakness, light-headedness, numbness and headaches.   Hematological: Negative for adenopathy.   Psychiatric/Behavioral: Negative.        Physical Exam  Vital Signs: /87   Pulse 76   Temp 97.5 °F (36.4 °C) (Oral)   Resp 16   Ht 170.2 cm (67\")   Wt 106 kg (233 lb 4.8 oz)   SpO2 97%   BMI 36.54 kg/m²   Pain Score    08/07/19 1029   PainSc: 0-No pain      General Appearance:  alert, cooperative, no apparent distress, appears stated age and obese   Neurologic/Psychiatric: A&O x 3, gait steady, appropriate affect   HEENT:  Normocephalic, without obvious abnormality, mucous membranes moist   Lungs:   Clear to auscultation bilaterally; respirations regular, even, and unlabored bilaterally   Heart:  Regular rate and rhythm, no murmurs appreciated   Breasts:  Symmetrical, no masses, no lesions and no nipple discharge   Abdomen:   Soft, non-tender, non-distended and no organomegaly   Lymph nodes: No axillary adenopathy noted   Extremities: Normal, atraumatic; no clubbing, cyanosis, or edema    Skin: No rashes, ulcers, or suspicious lesions noted   Pelvic: deferred       Assessment and Plan:    Blanca was seen today for follow-up.    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  Clinical breast exam every 6 months.  Annual mammogram starting at age 40, or 10 years prior to the earliest diagnosis in the family.   Annual MRI starting at age 40, or 10 years prior to the earliest diagnosis in the family.   Consider chemoprevention for breast cancer risk reduction (tamoxifen/evista).     Discussed the purpose of high risk clinic in coordinating, scheduling, and planning screening interventions to include yearly mammograms, yearly screening breast MRI, Q6 month CBE, and monthly SBE. Colonoscopy and GYN screening per national guidelines.   Reviewed technique for SBE including concerning findings to report.  Discussed yearly screening mammogram. Results will be reported by breast imaging center and a copy of report will be sent to our office for review. Mammogram currently overdue, ordered to be completed now.  Discussed screening breast MRI scheduling based on menstrual cycles, HRT. We also discussed the increased sensitivity of MRI screening and possibility of call- backs for additional imaging or biopsies to establish baseline. Currently UTD, will plan to repeat 6 months after mammogram in Feb or March 2020.   Discussed vitamin D as benefit to bone health and possible benefit to breast health. Recommended 1000 IU daily unless contraindicated  Discussed benefit of Vitamin E for fibrocystic breasts/breast pain  Evista x 10 years, prescribed by PCP. I told her there is no data to support using this medication for risk reduction past 5 years, however there is some new evidence to show the benefit of hormone blockade in women who have had a previous breast cancer diagnosis.  She is going to discuss options further with her primary care doctor at her next visit.        Return to clinic in 1 year for High Risk visit.      Shima Phipps,  APRN

## 2019-09-20 ENCOUNTER — HOSPITAL ENCOUNTER (OUTPATIENT)
Dept: MAMMOGRAPHY | Facility: HOSPITAL | Age: 74
Discharge: HOME | End: 2019-09-20
Admitting: NURSE PRACTITIONER

## 2019-09-20 DIAGNOSIS — Z15.01 GENETIC SUSCEPTIBILITY TO MALIGNANT NEOPLASM OF BREAST: ICD-10-CM

## 2019-09-20 PROCEDURE — 77067 SCR MAMMO BI INCL CAD: CPT

## 2019-09-20 PROCEDURE — 77067 SCR MAMMO BI INCL CAD: CPT | Performed by: RADIOLOGY

## 2019-09-20 PROCEDURE — 77063 BREAST TOMOSYNTHESIS BI: CPT | Performed by: RADIOLOGY

## 2019-09-20 PROCEDURE — 77063 BREAST TOMOSYNTHESIS BI: CPT

## 2019-12-04 ENCOUNTER — TRANSCRIBE ORDERS (OUTPATIENT)
Dept: LAB | Facility: HOSPITAL | Age: 74
End: 2019-12-04

## 2019-12-04 ENCOUNTER — LAB (OUTPATIENT)
Dept: LAB | Facility: HOSPITAL | Age: 74
End: 2019-12-04

## 2019-12-04 DIAGNOSIS — M79.662 BILATERAL CALF PAIN: ICD-10-CM

## 2019-12-04 DIAGNOSIS — M79.661 BILATERAL CALF PAIN: Primary | ICD-10-CM

## 2019-12-04 DIAGNOSIS — M79.662 BILATERAL CALF PAIN: Primary | ICD-10-CM

## 2019-12-04 DIAGNOSIS — M79.661 BILATERAL CALF PAIN: ICD-10-CM

## 2019-12-04 LAB — D DIMER PPP FEU-MCNC: 1.87 MCGFEU/ML (ref 0–0.56)

## 2019-12-04 PROCEDURE — 36415 COLL VENOUS BLD VENIPUNCTURE: CPT

## 2019-12-04 PROCEDURE — 85379 FIBRIN DEGRADATION QUANT: CPT

## 2020-01-13 DIAGNOSIS — Z15.01 GENETIC SUSCEPTIBILITY TO MALIGNANT NEOPLASM OF BREAST: Primary | ICD-10-CM

## 2020-03-02 ENCOUNTER — HOSPITAL ENCOUNTER (OUTPATIENT)
Dept: MRI IMAGING | Facility: HOSPITAL | Age: 75
Discharge: HOME | End: 2020-03-02
Admitting: NURSE PRACTITIONER

## 2020-03-02 DIAGNOSIS — Z15.01 GENETIC SUSCEPTIBILITY TO MALIGNANT NEOPLASM OF BREAST: ICD-10-CM

## 2020-03-02 LAB — CREAT BLDA-MCNC: 1 MG/DL (ref 0.6–1.3)

## 2020-03-02 PROCEDURE — C8908 MRI W/O FOL W/CONT, BREAST,: HCPCS

## 2020-03-02 PROCEDURE — 77049 MRI BREAST C-+ W/CAD BI: CPT | Performed by: RADIOLOGY

## 2020-03-02 PROCEDURE — C8937 CAD BREAST MRI: HCPCS

## 2020-03-02 PROCEDURE — 82565 ASSAY OF CREATININE: CPT

## 2020-03-02 PROCEDURE — A9577 INJ MULTIHANCE: HCPCS | Performed by: NURSE PRACTITIONER

## 2020-03-02 PROCEDURE — 0 GADOBENATE DIMEGLUMINE 529 MG/ML SOLUTION: Performed by: NURSE PRACTITIONER

## 2020-03-02 RX ADMIN — GADOBENATE DIMEGLUMINE 20 ML: 529 INJECTION, SOLUTION INTRAVENOUS at 14:01

## 2020-03-03 ENCOUNTER — TELEPHONE (OUTPATIENT)
Dept: MRI IMAGING | Facility: HOSPITAL | Age: 75
End: 2020-03-03

## 2020-03-03 NOTE — TELEPHONE ENCOUNTER
Called patient with MRI Breast results. Recommended yearly high risk screening. Pt expressed understanding and was encouraged to call with any further questions or concerns.

## 2020-05-22 ENCOUNTER — TRANSCRIBE ORDERS (OUTPATIENT)
Dept: ADMINISTRATIVE | Facility: HOSPITAL | Age: 75
End: 2020-05-22

## 2020-05-22 DIAGNOSIS — I71.20 THORACIC AORTIC ANEURYSM, WITHOUT RUPTURE: Primary | ICD-10-CM

## 2020-05-28 ENCOUNTER — HOSPITAL ENCOUNTER (OUTPATIENT)
Dept: CT IMAGING | Facility: HOSPITAL | Age: 75
Discharge: HOME | End: 2020-05-28
Admitting: INTERNAL MEDICINE

## 2020-05-28 DIAGNOSIS — I71.20 THORACIC AORTIC ANEURYSM, WITHOUT RUPTURE: ICD-10-CM

## 2020-05-28 LAB — CREAT BLDA-MCNC: 0.9 MG/DL (ref 0.6–1.3)

## 2020-05-28 PROCEDURE — 0 IOPAMIDOL PER 1 ML: Performed by: INTERNAL MEDICINE

## 2020-05-28 PROCEDURE — 82565 ASSAY OF CREATININE: CPT

## 2020-05-28 PROCEDURE — 71275 CT ANGIOGRAPHY CHEST: CPT

## 2020-05-28 RX ADMIN — IOPAMIDOL 95 ML: 755 INJECTION, SOLUTION INTRAVENOUS at 10:54

## 2020-07-29 NOTE — TELEPHONE ENCOUNTER
Called pt with Breast MRI results. Recommended yearly MRI High Risk Screening. Pt verbalized understanding.    · Prior baseline noted at 1 2-1 4  · Monitor with BMP and will avoid/limit nephrotoxins

## 2020-08-05 ENCOUNTER — OFFICE VISIT (OUTPATIENT)
Dept: GYNECOLOGIC ONCOLOGY | Facility: CLINIC | Age: 75
End: 2020-08-05

## 2020-08-05 VITALS
OXYGEN SATURATION: 97 % | RESPIRATION RATE: 16 BRPM | WEIGHT: 240 LBS | BODY MASS INDEX: 37.59 KG/M2 | DIASTOLIC BLOOD PRESSURE: 74 MMHG | SYSTOLIC BLOOD PRESSURE: 159 MMHG | HEART RATE: 90 BPM | TEMPERATURE: 97.8 F

## 2020-08-05 DIAGNOSIS — Z91.89 AT HIGH RISK FOR BREAST CANCER: Primary | ICD-10-CM

## 2020-08-05 PROCEDURE — 99213 OFFICE O/P EST LOW 20 MIN: CPT | Performed by: NURSE PRACTITIONER

## 2020-08-05 RX ORDER — TRAZODONE HYDROCHLORIDE 50 MG/1
TABLET ORAL
COMMUNITY

## 2020-08-05 NOTE — PROGRESS NOTES
GYN ONCOLOGY HIGH RISK CLINIC    Blanca Sparrow  [id number]  1945    Chief Complaint:   Chief Complaint   Patient presents with   • Follow-up     no complaints       HISTORY OF PRESENT ILLNESS:    Blanca Sparrow is a 75 y.o. year old female here today for her high risk visit. She has a strong family history of breast cancer with a lifetime risk of developing breast cancer estimated to be 26%. She is completing monthly self breast exams and has no new breast complaints today. Her current breast screening regimen includes yearly mammograms, last 9/20/2019, and yearly screening breast MRI, last 3/2/2020.         Past Medical History:   Diagnosis Date   • Aortic aneurysm, abdominal (CMS/HCC)    • Arthritis    • Hypertension        Past Surgical History:   Procedure Laterality Date   • ACHILLES TENDON SURGERY     • BREAST BIOPSY Right     BENIGN CORE BX, PT UNSURE OF DATE   • HYSTERECTOMY     • OOPHORECTOMY     • OTHER SURGICAL HISTORY      APPEY   • SKIN CANCER EXCISION     • TONSILLECTOMY     • TOTAL HIP ARTHROPLASTY REVISION Right    • TOTAL KNEE ARTHROPLASTY Left        Family History   Problem Relation Age of Onset   • Heart disease Mother    • Osteoarthritis Mother    • Breast cancer Mother 50   • Heart disease Father    • Diabetes Father    • Stroke Father    • Breast cancer Sister 38   • Ovarian cancer Maternal Grandmother         ?       Health Maintenance:    Regular self breast exam: yes  Mammogram: 9/20/2019, BiRADS I. History of abnormal mammogram: no  Breast MRI: 3/2/2020, BiRADS II. Results: negative    Risk Asessment: 26%    Allergies: No Known Allergies    Medications: Medications have been reviewed in the chart and reconciled.     Review of Systems   Constitutional: Negative for appetite change, chills, fatigue, fever and unexpected weight change.   Respiratory: Negative for cough, shortness of breath and wheezing.    Cardiovascular: Negative for chest pain, palpitations and leg swelling.    Gastrointestinal: Negative for abdominal distention, abdominal pain, blood in stool, constipation, diarrhea, nausea and vomiting.   Endocrine: Negative.    Genitourinary: Negative for dyspareunia, dysuria, frequency, genital sores, hematuria, pelvic pain, urgency, vaginal bleeding, vaginal discharge and vaginal pain.   Musculoskeletal: Negative for arthralgias, gait problem and joint swelling.   Neurological: Negative for dizziness, seizures, syncope, weakness, light-headedness, numbness and headaches.   Hematological: Negative for adenopathy.   Psychiatric/Behavioral: Negative.        Physical Exam  Vital Signs: /74   Pulse 90   Temp 97.8 °F (36.6 °C) (Temporal)   Resp 16   Wt 109 kg (240 lb)   SpO2 97%   BMI 37.59 kg/m²   Pain Score    08/05/20 1018   PainSc: 0-No pain      General Appearance:  alert, cooperative, no apparent distress, appears stated age and obese   Neurologic/Psychiatric: A&O x 3, gait steady, appropriate affect   HEENT:  Normocephalic, without obvious abnormality, mucous membranes moist   Lungs:   Clear to auscultation bilaterally; respirations regular, even, and unlabored bilaterally   Heart:  Regular rate and rhythm, no murmurs appreciated   Breasts:  Symmetrical, no masses, no lesions and no nipple discharge   Abdomen:   Soft, non-tender, non-distended and no organomegaly   Lymph nodes: No axillary adenopathy noted   Extremities: Normal, atraumatic; no clubbing, cyanosis, or edema    Skin: No rashes, ulcers, or suspicious lesions noted   Pelvic: deferred       Assessment and Plan:    There are no diagnoses linked to this encounter.    Patient Education:  Reviewed screening schedule related to diagnosis as follows:  Monthly breast self-exam starting at age 18.  Clinical breast exam every 6 months.  Annual mammogram starting at age 40, or 10 years prior to the earliest diagnosis in the family.   Annual MRI starting at age 40, or 10 years prior to the earliest diagnosis in the  family.   Consider chemoprevention for breast cancer risk reduction (tamoxifen/evista).     Discussed the purpose of high risk clinic in coordinating, scheduling, and planning screening interventions to include yearly mammograms, yearly screening breast MRI, Q6 month CBE, and monthly SBE. Colonoscopy and GYN screening per national guidelines.   Reviewed technique for SBE including concerning findings to report.  Discussed yearly screening mammogram. Results will be reported by breast imaging center and a copy of report will be sent to our office for review. Mammogram due to repeat in 9/2021.   Discussed screening breast MRI scheduling based on menstrual cycles, HRT. We also discussed the increased sensitivity of MRI screening and possibility of call- backs for additional imaging or biopsies to establish baseline. Currently UTD, will plan to repeat in 3/2021.   Discussed vitamin D as benefit to bone health and possible benefit to breast health. Recommended 1000 IU daily unless contraindicated  Discussed benefit of Vitamin E for fibrocystic breasts/breast pain  Evista x 10 years, prescribed by PCP. I told her there is no data to support using this medication for risk reduction past 5 years, however there is some new evidence to show the benefit of hormone blockade in women who have had a previous breast cancer diagnosis.  She is going to discuss options further with her primary care doctor at her next visit.      Advance Care Planning   ACP discussion was held with the patient during this visit. Patient does not have an advance directive, information provided.    Return to clinic in 1 year for High Risk visit.      BIRGIT Foley

## 2020-08-05 NOTE — PROGRESS NOTES
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     History of present illness:  The patient is a 75 y.o. year old female who presents today for treatment and evaluation of the above issues.  She has a strong family history of breast cancer with a lifetime risk of developing breast cancer estimated to be 26%.  Last mammogram was 9/20/19 and was BI-RADS category 1, negative. MRI breast 3/02/20 and was BI-RADS category 2, benign.      ***  For new patients, [facility] intake form from *** was reviewed and confirmed.    OBGYN History:  She is a G*** P***.  She ***use HRT. She does *** have a history of abnormal pap smears.      Oncologic History:   No history exists.         Past Medical History:   Diagnosis Date   • Aortic aneurysm, abdominal (CMS/HCC)    • Arthritis    • Hypertension        Past Surgical History:   Procedure Laterality Date   • ACHILLES TENDON SURGERY     • BREAST BIOPSY Right     BENIGN CORE BX, PT UNSURE OF DATE   • HYSTERECTOMY     • OOPHORECTOMY     • OTHER SURGICAL HISTORY      APPEY   • SKIN CANCER EXCISION     • TONSILLECTOMY     • TOTAL HIP ARTHROPLASTY REVISION Right    • TOTAL KNEE ARTHROPLASTY Left        MEDICATIONS: The current medication list was reviewed with the patient and updated in the EMR this date per the Medical Assistant. Medication dosages and frequencies were confirmed to be accurate.      Allergies:  has No Known Allergies.    Social History:   Social History     Socioeconomic History   • Marital status:      Spouse name: Not on file   • Number of children: 1   • Years of education: Not on file   • Highest education level: Not on file   Occupational History     Employer: RETIRED   Tobacco Use   • Smoking status: Former Smoker     Packs/day: 1.00     Types: Cigarettes     Start date: 1968     Last attempt to quit: 1983      Years since quittin.6   • Smokeless tobacco: Never Used   • Tobacco comment: Pt used nicoret since quit smoking    Substance and Sexual Activity   • Alcohol use: No   • Drug use: No   • Sexual activity: Never   Social History Narrative    Lives in [place], [place]       Family History:    Family History   Problem Relation Age of Onset   • Heart disease Mother    • Osteoarthritis Mother    • Breast cancer Mother 50   • Heart disease Father    • Diabetes Father    • Stroke Father    • Breast cancer Sister 38   • Ovarian cancer Maternal Grandmother         ?       Health Maintenance:    Health Maintenance   Topic Date Due   • ANNUAL PHYSICAL  1950   • TDAP/TD VACCINES (1 - Tdap) 1958   • ZOSTER VACCINE (1 of 2) 1997   • Pneumococcal Vaccine Once at 65 Years Old  2012   • HEPATITIS C SCREENING  2017   • COLONOSCOPY  2017   • INFLUENZA VACCINE  2020   • MAMMOGRAM  2020       ***  Review of Systems    Physical Exam    There were no vitals filed for this visit.    There is no height or weight on file to calculate BMI.    Wt Readings from Last 3 Encounters:   19 106 kg (233 lb 4.8 oz)   07/15/19 105 kg (231 lb 6.4 oz)   18 102 kg (224 lb)         GENERAL: Alert, well-appearing female appearing her stated age who is in no apparent distress.   HEENT: Sclera anicteric. Head normocephalic, atraumatic. Mucus membranes moist.   NECK: Trachea midline, supple, without masses.  No thyromegaly.   BREASTS: Deferred  CARDIOVASCULAR: Normal rate, regular rhythm, no murmurs, rubs, or gallops.  ***No peripheral edema.  RESPIRATORY: Clear to auscultation bilaterally, normal respiratory effort  BACK:  No CVA tenderness, no vertebral tenderness on palpation  GASTROINTESTINAL:  Abdomen is soft, non-tender, non-distended, no rebound or guarding, ***no masses, or hernias. No HSM.  Incisions ***  SKIN:  Warm, dry, well-perfused.  All visible areas intact.  No rashes, lesions,  ulcers.  PSYCHIATRIC: AO x3, with appropriate affect, normal thought processes.  NEUROLOGIC: No focal deficits.  ***Moves extremities well.  MUSCULOSKELETAL: Normal gait and station.   EXTREMITIES:   No cyanosis, clubbing, symmetric.  LYMPHATICS:  No cervical or inguinal adenopathy noted.     PELVIC exam:    {GYN Exam:24133}    ECOG PS 0***    PROCEDURES:  ***    Diagnostic Data:  ***    Ct Angiogram Chest With & Without Contrast    Result Date: 5/28/2020  A sending thoracic aortic aneurysm at 4.5 cm. Old healed granulomatous disease seen within chest with mild chronic changes identified at the right lung base. No acute intrathoracic abnormality.  D:  05/28/2020 E:  05/28/2020  This report was finalized on 5/28/2020 6:23 PM by Dr. Chloé Kaur MD.        Lab Results   Component Value Date    CREATININE 0.90 05/28/2020     No results found for:         Assessment/Plan   This is a 75 y.o. 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   No diagnosis found.    Pain assessment was performed today as a part of patient’s care.  For patients with pain related to surgery, gynecologic malignancy or cancer treatment, the plan is as noted in the assessment/plan.  For patients with pain not related to these issues, they are to seek any further needed care from a more appropriate provider, such as PCP.      No orders of the defined types were placed in this encounter.      FOLLOW UP: No follow-ups on file.    I personally spent *** minutes face-to-face with the patient of which *** was spent performing counseling/coordiantion of care.    Electronically Signed by: Karli Flores MD  Date: 8/4/2020

## 2020-09-23 ENCOUNTER — OFFICE VISIT (OUTPATIENT)
Dept: ORTHOPEDIC SURGERY | Facility: CLINIC | Age: 75
End: 2020-09-23

## 2020-09-23 VITALS — HEART RATE: 83 BPM | OXYGEN SATURATION: 98 % | HEIGHT: 67 IN | BODY MASS INDEX: 37.73 KG/M2 | WEIGHT: 240.4 LBS

## 2020-09-23 DIAGNOSIS — M79.671 RIGHT FOOT PAIN: Primary | ICD-10-CM

## 2020-09-23 DIAGNOSIS — M24.574 CONTRACTURE OF JOINT OF RIGHT FOOT: ICD-10-CM

## 2020-09-23 PROCEDURE — 99203 OFFICE O/P NEW LOW 30 MIN: CPT | Performed by: ORTHOPAEDIC SURGERY

## 2020-09-23 NOTE — PROGRESS NOTES
"NEW PATIENT    Patient: Blanca Sparrow  : 1945    Primary Care Provider: Josiah Wagner MD    Requesting Provider: As above    Pain of the Right Foot      History    Chief Complaint: Right foot pain    History of Present Illness: This is an extremely pleasant 75-year-old woman with pain in the right second toe.  She notes that she has \"developed a knot\" on it.  It rubs in her shoes.  She reports the pain is fairly mild, she rates it as 1-2 out of 10.  She is wondering what she can do to help keep it from getting worse.  She has had the symptoms for about a year.  She had a previous Achilles insertion surgery sometime around , no pain there.    Current Outpatient Medications on File Prior to Visit   Medication Sig Dispense Refill   • Calcium Carbonate-Vitamin D (CALCIUM + D PO) Take  by mouth daily.     • celecoxib (CeleBREX) 200 MG capsule TAKE 1 CAPSULE EVERY DAY  3   • Cholecalciferol (VITAMIN D-3 PO) Take 5,000 Units by mouth daily.     • cyclobenzaprine (FLEXERIL) 10 MG tablet TK 1 T PO TID  3   • montelukast (SINGULAIR) 10 MG tablet Take 10 mg by mouth daily.     • Olmesartan Medoxomil (BENICAR PO) Take 30 mg by mouth Daily.     • omeprazole (PriLOSEC) 40 MG capsule Take 40 mg by mouth daily.     • pravastatin (PRAVACHOL) 40 MG tablet Take 40 mg by mouth Daily.     • raloxifene (EVISTA) 60 MG tablet Take 60 mg by mouth daily.     • raNITIdine (ZANTAC) 300 MG tablet Take 300 mg by mouth Every Night.     • traZODone (DESYREL) 50 MG tablet trazodone 50 mg tablet     • venlafaxine XR (EFFEXOR-XR) 150 MG 24 hr capsule Take 150 mg by mouth daily.       No current facility-administered medications on file prior to visit.       No Known Allergies   Past Medical History:   Diagnosis Date   • Aortic aneurysm, abdominal (CMS/HCC)    • Arthritis    • Hypertension      Past Surgical History:   Procedure Laterality Date   • ACHILLES TENDON SURGERY     • BREAST BIOPSY Right     BENIGN CORE BX, PT UNSURE OF DATE " "  • HYSTERECTOMY     • OOPHORECTOMY     • OTHER SURGICAL HISTORY      APPEY   • SKIN CANCER EXCISION     • TONSILLECTOMY     • TOTAL HIP ARTHROPLASTY REVISION Right    • TOTAL KNEE ARTHROPLASTY Left      Family History   Problem Relation Age of Onset   • Heart disease Mother    • Osteoarthritis Mother    • Breast cancer Mother 50   • Heart disease Father    • Diabetes Father    • Stroke Father    • Breast cancer Sister 38   • Ovarian cancer Maternal Grandmother         ?      Social History     Socioeconomic History   • Marital status:      Spouse name: Not on file   • Number of children: 1   • Years of education: Not on file   • Highest education level: Not on file   Occupational History     Employer: RETIRED   Tobacco Use   • Smoking status: Former Smoker     Packs/day: 1.00     Types: Cigarettes     Start date:      Quit date:      Years since quittin.7   • Smokeless tobacco: Never Used   • Tobacco comment: Pt used nicoret since quit smoking    Substance and Sexual Activity   • Alcohol use: No   • Drug use: No   • Sexual activity: Never   Social History Narrative    Lives in [place], [place]        Review of Systems   Constitutional: Negative.    HENT: Negative.    Eyes: Negative.    Respiratory: Negative.    Cardiovascular: Negative.    Gastrointestinal: Negative.    Endocrine: Negative.    Genitourinary: Negative.    Musculoskeletal: Positive for arthralgias.   Skin: Negative.    Allergic/Immunologic: Negative.    Neurological: Negative.    Hematological: Negative.    Psychiatric/Behavioral: Negative.        The following portions of the patient's history were reviewed and updated as appropriate: allergies, current medications, past family history, past medical history, past social history, past surgical history and problem list.    Physical Exam:   Pulse 83   Ht 170.2 cm (67\")   Wt 109 kg (240 lb 6.4 oz)   SpO2 98%   BMI 37.65 kg/m²   GENERAL: Body habitus: overweight    Lower extremity edema: " Right: none; Left: none    Varicose veins:  Right: mild; Left: mild    Gait: normal     Mental Status:  awake and alert; oriented to person, place, and time    Voice:  clear  SKIN:  Lower extremity: warm and dry    Hair Growth(lower extremity):  Right:normal; Left:  normal  NAILS: Toenails: normal  HEENT: Head: Normocephalic, atraumatic,  without obvious abnormality.  eye: normal external eye, no icterus  ears:normal external ears  PULM:  Repiratory effort normal  CV:  Dorsalis Pedis:  Right: 2+; Left:2+    Posterior Tibial: Right:2+; Left:2+    Capillary Refill:  Brisk  MSK:  Hand:mild arthritis, Heberden's nodes      Tibia:  Right:  non tender; Left:  non tender      Ankle:  Right: non tender, ROM  normal and symmetric and motor function  normal, healed posterior central Achilles incision; Left:  non tender, ROM  normal and symmetric and motor function  normal      Foot:  Right:  Second hammertoe, about 20 degree flexion contracture of the PIP joint, mild medial angulation of the MTPJ, reducible, no tenderness; Left:  No tenderness, minimal hammertoe      NEURO: Heel Walking:  Right:  normal; Left:  normal    Toe Walking:  Right:  normal; Left:  normal     An[place]osa-Meghann 5.07 monofilament test: normal    Lower extremity sensation: intact     Reflexes:  Biceps:  Right:  not tested; Left:  not tested           Quads:  Right:  not tested; Left:  not tested           Ankle:  Right:  not tested; Left:  not tested      Calf Atrophy:none    Motor Function: all 5/5         Medical Decision Making    Data Review:   ordered and reviewed x-rays today    Assessment and Plan/ Diagnosis/Treatment options:   1. Contracture of joint of right foot  She has a hammertoe.  I explained there is no mass on the toe, but rather the bump is the PIP joint in a flexed position.  We talked about the options.  I explained these develop commonly, they are often genetic.  We talked about how to keep them from rubbing in shoes.  I showed her  how to use a hammertoe crest pad, Budin splint, and lambswool.  We talked about shoe wear.  We talked about deep wide shoes to help prevent rubbing.  I explained surgery is only a last resort, she agrees.  We went over everything in detail, I will be happy to see her anytime  *      Radiology Ordered []  Radiology Reports Reviewed []      Radiology Images Reviewed []   Labs Reviewed []    Labs Ordered []   PCP Records Reviewed []    Provider Records Reviewed []    ER Records Reviewed []    Hospital Records Reviewed []    History Obtained From Family []    Phone conversation with Provider []    Records Requested []

## 2020-09-29 ENCOUNTER — TRANSCRIBE ORDERS (OUTPATIENT)
Dept: ADMINISTRATIVE | Facility: HOSPITAL | Age: 75
End: 2020-09-29

## 2020-09-29 DIAGNOSIS — Z12.31 VISIT FOR SCREENING MAMMOGRAM: Primary | ICD-10-CM

## 2020-10-13 ENCOUNTER — HOSPITAL ENCOUNTER (OUTPATIENT)
Dept: MAMMOGRAPHY | Facility: HOSPITAL | Age: 75
Discharge: HOME | End: 2020-10-13
Admitting: INTERNAL MEDICINE

## 2020-10-13 DIAGNOSIS — Z12.31 VISIT FOR SCREENING MAMMOGRAM: ICD-10-CM

## 2020-10-13 PROCEDURE — 77063 BREAST TOMOSYNTHESIS BI: CPT

## 2020-10-13 PROCEDURE — 77067 SCR MAMMO BI INCL CAD: CPT

## 2020-10-13 PROCEDURE — 77067 SCR MAMMO BI INCL CAD: CPT | Performed by: RADIOLOGY

## 2020-10-13 PROCEDURE — 77063 BREAST TOMOSYNTHESIS BI: CPT | Performed by: RADIOLOGY

## 2021-01-18 ENCOUNTER — TELEPHONE (OUTPATIENT)
Dept: CARDIAC SURGERY | Facility: CLINIC | Age: 76
End: 2021-01-18

## 2021-01-18 NOTE — TELEPHONE ENCOUNTER
Pt stopped by the office but did not receive a letter and is ready to schedule her 18 mo f/u appt and testing with Dr. Stringer (recall says [facility] but she has only ever seen My[facility]). Verified demo.

## 2021-01-21 DIAGNOSIS — I71.20 THORACIC AORTIC ANEURYSM WITHOUT RUPTURE (HCC): Primary | ICD-10-CM

## 2021-01-25 DIAGNOSIS — Z91.89 AT HIGH RISK FOR BREAST CANCER: ICD-10-CM

## 2021-01-25 DIAGNOSIS — Z15.01 GENETIC SUSCEPTIBILITY TO MALIGNANT NEOPLASM OF BREAST: Primary | ICD-10-CM

## 2021-03-01 ENCOUNTER — TELEPHONE (OUTPATIENT)
Dept: CARDIAC SURGERY | Facility: CLINIC | Age: 76
End: 2021-03-01

## 2021-03-01 NOTE — TELEPHONE ENCOUNTER
Spoke to Dr Stringer regarding rescheduling  pts appt on 03/18/21. Her CTA will be a year old at the time we see her back after moving it. He advised to see her back in OCT 2021 with new CTA.

## 2021-05-12 ENCOUNTER — TRANSCRIBE ORDERS (OUTPATIENT)
Dept: ADMINISTRATIVE | Facility: HOSPITAL | Age: 76
End: 2021-05-12

## 2021-05-12 DIAGNOSIS — I71.20 THORACIC AORTIC ANEURYSM WITHOUT RUPTURE (HCC): Primary | ICD-10-CM

## 2021-06-16 ENCOUNTER — HOSPITAL ENCOUNTER (OUTPATIENT)
Dept: CT IMAGING | Facility: HOSPITAL | Age: 76
Discharge: HOME | End: 2021-06-16
Admitting: INTERNAL MEDICINE

## 2021-06-16 DIAGNOSIS — I71.20 THORACIC AORTIC ANEURYSM WITHOUT RUPTURE (HCC): ICD-10-CM

## 2021-06-16 LAB — CREAT BLDA-MCNC: 0.8 MG/DL (ref 0.6–1.3)

## 2021-06-16 PROCEDURE — 82565 ASSAY OF CREATININE: CPT

## 2021-06-16 PROCEDURE — 0 IOPAMIDOL PER 1 ML: Performed by: INTERNAL MEDICINE

## 2021-06-16 PROCEDURE — 71275 CT ANGIOGRAPHY CHEST: CPT

## 2021-06-16 RX ADMIN — IOPAMIDOL 95 ML: 755 INJECTION, SOLUTION INTRAVENOUS at 10:53

## 2021-06-21 ENCOUNTER — HOSPITAL ENCOUNTER (OUTPATIENT)
Dept: MRI IMAGING | Facility: HOSPITAL | Age: 76
Discharge: HOME | End: 2021-06-21

## 2021-06-21 DIAGNOSIS — Z91.89 AT HIGH RISK FOR BREAST CANCER: ICD-10-CM

## 2021-06-21 DIAGNOSIS — Z15.01 GENETIC SUSCEPTIBILITY TO MALIGNANT NEOPLASM OF BREAST: ICD-10-CM

## 2021-06-30 ENCOUNTER — HOSPITAL ENCOUNTER (OUTPATIENT)
Dept: MRI IMAGING | Facility: HOSPITAL | Age: 76
End: 2021-06-30

## 2021-07-14 ENCOUNTER — HOSPITAL ENCOUNTER (OUTPATIENT)
Dept: MRI IMAGING | Facility: HOSPITAL | Age: 76
Discharge: HOME | End: 2021-07-14
Admitting: NURSE PRACTITIONER

## 2021-07-14 PROCEDURE — A9577 INJ MULTIHANCE: HCPCS | Performed by: NURSE PRACTITIONER

## 2021-07-14 PROCEDURE — 77049 MRI BREAST C-+ W/CAD BI: CPT | Performed by: RADIOLOGY

## 2021-07-14 PROCEDURE — 0 GADOBENATE DIMEGLUMINE 529 MG/ML SOLUTION: Performed by: NURSE PRACTITIONER

## 2021-07-14 PROCEDURE — C8937 CAD BREAST MRI: HCPCS

## 2021-07-14 PROCEDURE — 77049 MRI BREAST C-+ W/CAD BI: CPT

## 2021-07-14 RX ADMIN — GADOBENATE DIMEGLUMINE 19 ML: 529 INJECTION, SOLUTION INTRAVENOUS at 15:30

## 2021-07-16 ENCOUNTER — TELEPHONE (OUTPATIENT)
Dept: MRI IMAGING | Facility: HOSPITAL | Age: 76
End: 2021-07-16

## 2021-08-17 ENCOUNTER — OFFICE VISIT (OUTPATIENT)
Dept: GYNECOLOGIC ONCOLOGY | Facility: CLINIC | Age: 76
End: 2021-08-17

## 2021-08-17 VITALS
WEIGHT: 245 LBS | HEIGHT: 67 IN | SYSTOLIC BLOOD PRESSURE: 136 MMHG | HEART RATE: 82 BPM | BODY MASS INDEX: 38.45 KG/M2 | DIASTOLIC BLOOD PRESSURE: 75 MMHG | OXYGEN SATURATION: 99 % | TEMPERATURE: 98.7 F | RESPIRATION RATE: 20 BRPM

## 2021-08-17 DIAGNOSIS — Z91.89 AT HIGH RISK FOR BREAST CANCER: Primary | ICD-10-CM

## 2021-08-17 PROCEDURE — 99212 OFFICE O/P EST SF 10 MIN: CPT | Performed by: NURSE PRACTITIONER

## 2021-08-17 RX ORDER — NIACINAMIDE 500 MG
100 TABLET ORAL DAILY
COMMUNITY
End: 2023-01-19

## 2021-08-17 NOTE — PROGRESS NOTES
GYN ONCOLOGY HIGH RISK CLINIC    Blanca Sparrow  [id number]  1945    Chief Complaint:   Chief Complaint   Patient presents with   • Follow-up     no complaints       HISTORY OF PRESENT ILLNESS:    Blanca Sparrow is a 76 y.o. year old female here today for her high risk visit. She has a strong family history of breast cancer with a lifetime risk of developing breast cancer estimated to be 26%. She is completing monthly self breast exams and has no new breast complaints today. Her current breast screening regimen includes yearly mammograms, last 10/13/2020, and yearly screening breast MRI, last 7/14/2021. She sees Dr. Wagner for primary care, routine care, and management of chronic illnesses. She has gotten both doses of her COVID-19 vaccine. She is coping well since her 's death 1 year ago.       Past Medical History:   Diagnosis Date   • Aortic aneurysm, abdominal (CMS/HCC)    • Arthritis    • Hypertension        Past Surgical History:   Procedure Laterality Date   • ACHILLES TENDON SURGERY     • BREAST BIOPSY Right     BENIGN CORE BX, PT UNSURE OF DATE   • HYSTERECTOMY     • OOPHORECTOMY     • OTHER SURGICAL HISTORY      APPEY   • SKIN BIOPSY Right 08/2021    Upper Arm   • SKIN CANCER EXCISION     • TONSILLECTOMY     • TOTAL HIP ARTHROPLASTY REVISION Right    • TOTAL KNEE ARTHROPLASTY Left        Family History   Problem Relation Age of Onset   • Heart disease Mother    • Osteoarthritis Mother    • Breast cancer Mother 50   • Heart disease Father    • Diabetes Father    • Stroke Father    • Breast cancer Sister 38   • Ovarian cancer Maternal Grandmother         ?       Health Maintenance:    Regular self breast exam: yes  Mammogram: 10/13/2020, BiRADS I. History of abnormal mammogram: no  Breast MRI: 7/14/2021, BiRADS II. Results: negative    Risk Asessment: 26%    Allergies: No Known Allergies    Medications: Medications have been reviewed in the chart and reconciled.     Review of Systems  "  Constitutional: Negative for appetite change, chills, fatigue, fever and unexpected weight change.   Respiratory: Negative for cough, shortness of breath and wheezing.    Cardiovascular: Negative for chest pain, palpitations and leg swelling.   Gastrointestinal: Negative for abdominal distention, abdominal pain, blood in stool, constipation, diarrhea, nausea and vomiting.   Endocrine: Negative.    Genitourinary: Negative for dyspareunia, dysuria, frequency, genital sores, hematuria, pelvic pain, urgency, vaginal bleeding, vaginal discharge and vaginal pain.   Musculoskeletal: Negative for arthralgias, gait problem and joint swelling.   Neurological: Negative for dizziness, seizures, syncope, weakness, light-headedness, numbness and headaches.   Hematological: Negative for adenopathy.   Psychiatric/Behavioral: Negative.        Physical Exam  Vital Signs: /75 Comment: LUE  Pulse 82   Temp 98.7 °F (37.1 °C) (Infrared)   Resp 20   Ht 170.2 cm (67\")   Wt 111 kg (245 lb)   SpO2 99% Comment: RA  BMI 38.37 kg/m²   Pain Score    08/17/21 1307   PainSc: 0-No pain      General Appearance:  alert, cooperative, no apparent distress, appears stated age and obese   Neurologic/Psychiatric: A&O x 3, gait steady, appropriate affect   HEENT:  Normocephalic, without obvious abnormality, mucous membranes moist   Lungs:   Clear to auscultation bilaterally; respirations regular, even, and unlabored bilaterally   Heart:  Regular rate and rhythm, no murmurs appreciated   Breasts:  Symmetrical, no masses, no lesions and no nipple discharge   Abdomen:   Soft, non-tender, non-distended and no organomegaly   Lymph nodes: No axillary adenopathy noted   Extremities: Normal, atraumatic; no clubbing, cyanosis, or edema    Skin: No rashes, ulcers, or suspicious lesions noted   Pelvic: deferred       Assessment and Plan:    Diagnoses and all orders for this visit:    1. At high risk for breast cancer (Primary)        Patient " Education:  Reviewed screening schedule related to diagnosis as follows:  Monthly breast self-exam starting at age 18.  Clinical breast exam every 6 months.  Annual mammogram starting at age 40, or 10 years prior to the earliest diagnosis in the family.   Annual MRI starting at age 40, or 10 years prior to the earliest diagnosis in the family.   Consider chemoprevention for breast cancer risk reduction (tamoxifen/evista).     Discussed the purpose of high risk clinic in coordinating, scheduling, and planning screening interventions to include yearly mammograms, yearly screening breast MRI, Q6 month CBE, and monthly SBE. Colonoscopy and GYN screening per national guidelines.   Reviewed technique for SBE including concerning findings to report.  Discussed yearly screening mammogram. Results will be reported by breast imaging center and a copy of report will be sent to our office for review. Mammogram due to repeat in 1/2022.   Discussed screening breast MRI scheduling based on menstrual cycles, HRT. We also discussed the increased sensitivity of MRI screening and possibility of call- backs for additional imaging or biopsies to establish baseline. Currently UTD, will plan to repeat in 7/2022.   Discussed vitamin D as benefit to bone health and possible benefit to breast health. Recommended 1000 IU daily unless contraindicated  Discussed benefit of Vitamin E for fibrocystic breasts/breast pain      Return to clinic in 1 year for High Risk visit.      BIRGIT Foley

## 2021-09-10 DIAGNOSIS — I71.20 THORACIC AORTIC ANEURYSM WITHOUT RUPTURE (HCC): Primary | ICD-10-CM

## 2021-09-23 ENCOUNTER — HOSPITAL ENCOUNTER (OUTPATIENT)
Dept: CT IMAGING | Facility: HOSPITAL | Age: 76
Discharge: HOME | End: 2021-09-23
Admitting: NURSE PRACTITIONER

## 2021-09-23 DIAGNOSIS — I71.20 THORACIC AORTIC ANEURYSM WITHOUT RUPTURE (HCC): ICD-10-CM

## 2021-09-23 LAB — CREAT BLDA-MCNC: 0.8 MG/DL (ref 0.6–1.3)

## 2021-09-23 PROCEDURE — 82565 ASSAY OF CREATININE: CPT

## 2021-09-23 PROCEDURE — 71275 CT ANGIOGRAPHY CHEST: CPT

## 2021-09-23 PROCEDURE — 25010000002 IOPAMIDOL 61 % SOLUTION: Performed by: NURSE PRACTITIONER

## 2021-09-23 RX ADMIN — IOPAMIDOL 95 ML: 612 INJECTION, SOLUTION INTRAVENOUS at 10:02

## 2021-10-12 ENCOUNTER — TRANSCRIBE ORDERS (OUTPATIENT)
Dept: ADMINISTRATIVE | Facility: HOSPITAL | Age: 76
End: 2021-10-12

## 2021-10-12 DIAGNOSIS — Z12.31 VISIT FOR SCREENING MAMMOGRAM: Primary | ICD-10-CM

## 2021-10-18 ENCOUNTER — OFFICE VISIT (OUTPATIENT)
Dept: CARDIAC SURGERY | Facility: CLINIC | Age: 76
End: 2021-10-18

## 2021-10-18 VITALS
DIASTOLIC BLOOD PRESSURE: 90 MMHG | OXYGEN SATURATION: 98 % | WEIGHT: 242 LBS | HEART RATE: 77 BPM | HEIGHT: 67 IN | BODY MASS INDEX: 37.98 KG/M2 | TEMPERATURE: 97.1 F | SYSTOLIC BLOOD PRESSURE: 130 MMHG

## 2021-10-18 DIAGNOSIS — I71.20 THORACIC AORTIC ANEURYSM WITHOUT RUPTURE (HCC): Primary | ICD-10-CM

## 2021-10-18 PROCEDURE — 99213 OFFICE O/P EST LOW 20 MIN: CPT | Performed by: NURSE PRACTITIONER

## 2021-10-18 RX ORDER — ASPIRIN 81 MG/1
81 TABLET ORAL DAILY
COMMUNITY
End: 2023-01-19

## 2021-10-18 NOTE — PROGRESS NOTES
[facility] Cardiothoracic Surgery Office Follow Up Note     Date of Encounter: 10/18/2021     YOB: 1945  Name: Blanca Sparrow    PCP: Josiah Wagner MD    Chief Complaint:    Chief Complaint   Patient presents with   • Follow-up     FU with CTA Chest for Ascending Aneurysm       History of Present Illness:    Blanca Sparrow is a 76 y.o. female with a history of hypertension and ascending aortic aneurysm.  Patient presents today for 18-month follow-up with CTA of the chest.  Last seen in the office on 7/15/2019 with Dr. Stringer with noted 4.3 cm ascending aortic aneurysm.  Since last office visit, patient denies any complaints.  She denies any chest pain or unusual back or flank pain.  She does have baseline low back pain.  She has been dealing over the last year with the death of her  following her role as primary caretaker with his diagnosis of Alzheimer's.  Home blood pressures have been stable with most recent 104/60.  She is followed closely by her PCP, Dr. Wagner.    Review of Systems:  Review of Systems   Constitutional: Negative for chills, decreased appetite, diaphoresis, fever, malaise/fatigue, night sweats and weight loss.   HENT: Negative for congestion, hoarse voice, sore throat and stridor.    Cardiovascular: Positive for dyspnea on exertion. Negative for chest pain, claudication, irregular heartbeat, leg swelling, near-syncope, orthopnea, palpitations, paroxysmal nocturnal dyspnea and syncope.   Respiratory: Negative for cough, hemoptysis, shortness of breath, sleep disturbances due to breathing, snoring, sputum production and wheezing.    Hematologic/Lymphatic: Negative for adenopathy and bleeding problem. Bruises/bleeds easily.   Skin: Negative for color change, dry skin, itching, poor wound healing and rash.   Musculoskeletal: Positive for arthritis, back pain and joint pain. Negative for falls and muscle weakness.   Gastrointestinal: Negative for abdominal pain,  anorexia, constipation, diarrhea, hematochezia, melena, nausea and vomiting.   Neurological: Negative for difficulty with concentration, disturbances in coordination, dizziness, loss of balance, numbness, seizures, vertigo and weakness.   Psychiatric/Behavioral: Negative for altered mental status, depression, memory loss and substance abuse. The patient does not have insomnia and is not nervous/anxious.    Allergic/Immunologic: Negative for persistent infections.       Allergies:  No Known Allergies    Medications:      Current Outpatient Medications:   •  aspirin 81 MG EC tablet, Take 81 mg by mouth Daily., Disp: , Rfl:   •  Calcium Carbonate-Vitamin D (CALCIUM + D PO), Take  by mouth daily., Disp: , Rfl:   •  celecoxib (CeleBREX) 200 MG capsule, TAKE 1 CAPSULE EVERY DAY, Disp: , Rfl: 3  •  Cholecalciferol (VITAMIN D-3 PO), Take 5,000 Units by mouth daily., Disp: , Rfl:   •  cyclobenzaprine (FLEXERIL) 10 MG tablet, Take 10 mg by mouth 3 (Three) Times a Day As Needed., Disp: , Rfl: 3  •  montelukast (SINGULAIR) 10 MG tablet, Take 10 mg by mouth daily., Disp: , Rfl:   •  niacinamide 500 MG tablet, 100 mg Daily., Disp: , Rfl:   •  Olmesartan Medoxomil (BENICAR PO), Take 30 mg by mouth Daily., Disp: , Rfl:   •  omeprazole (PriLOSEC) 40 MG capsule, Take 40 mg by mouth daily., Disp: , Rfl:   •  pravastatin (PRAVACHOL) 40 MG tablet, Take 40 mg by mouth Daily., Disp: , Rfl:   •  raloxifene (EVISTA) 60 MG tablet, Take 60 mg by mouth daily., Disp: , Rfl:   •  raNITIdine (ZANTAC) 300 MG tablet, Take 300 mg by mouth Every Night., Disp: , Rfl:   •  traZODone (DESYREL) 50 MG tablet, trazodone 50 mg tablet, Disp: , Rfl:   •  venlafaxine XR (EFFEXOR-XR) 150 MG 24 hr capsule, Take 150 mg by mouth daily., Disp: , Rfl:     Social History     Socioeconomic History   • Marital status:    • Number of children: 1   Tobacco Use   • Smoking status: Former Smoker     Packs/day: 1.00     Years: 13.00     Pack years: 13.00     Types:  "Cigarettes     Start date:      Quit date:      Years since quittin.8   • Smokeless tobacco: Never Used   • Tobacco comment: Pt used nicoret since quit smoking    Vaping Use   • Vaping Use: Never used   Substance and Sexual Activity   • Alcohol use: No   • Drug use: No   • Sexual activity: Never       Family History   Problem Relation Age of Onset   • Heart disease Mother    • Osteoarthritis Mother    • Breast cancer Mother 50   • Heart disease Father    • Diabetes Father    • Stroke Father    • Breast cancer Sister 38   • Ovarian cancer Maternal Grandmother         ?       Past Medical History:   Diagnosis Date   • Aortic aneurysm, abdominal (HCC)    • Arthritis    • Hypertension        Past Surgical History:   Procedure Laterality Date   • ACHILLES TENDON SURGERY     • BREAST BIOPSY Right     BENIGN CORE BX, PT UNSURE OF DATE   • HYSTERECTOMY     • OOPHORECTOMY     • OTHER SURGICAL HISTORY      APPEY   • SKIN BIOPSY Right 2021    Upper Arm   • SKIN CANCER EXCISION     • TONSILLECTOMY     • TOTAL HIP ARTHROPLASTY REVISION Right    • TOTAL KNEE ARTHROPLASTY Left        I have reviewed the following portions of the patient's history: allergies, current medications, past family history, past medical history, past social history, past surgical history and problem list and confirm it's accurate.    Physical Exam:  Vital Signs:    Vitals:    10/18/21 0933   BP: 130/90   BP Location: Left arm   Patient Position: Sitting   Pulse: 77   Temp: 97.1 °F (36.2 °C)   SpO2: 98%   Weight: 110 kg (242 lb)   Height: 170.2 cm (67\")     Body mass index is 37.9 kg/m².     Physical Exam  Vitals and nursing note reviewed.   Constitutional:       Appearance: Normal appearance. She is well-developed and well-groomed.   HENT:      Head: Normocephalic and atraumatic.   Cardiovascular:      Rate and Rhythm: Normal rate and regular rhythm.      Heart sounds: Normal heart sounds, S1 normal and S2 normal. No murmur heard.  No " friction rub.   Pulmonary:      Comments: Unlabored, Clear to auscultation bilaterally  Abdominal:      General: Bowel sounds are normal.      Palpations: Abdomen is soft.      Tenderness: There is no abdominal tenderness.   Musculoskeletal:      Cervical back: Neck supple.      Right lower leg: No edema.      Left lower leg: No edema.   Skin:     General: Skin is warm and dry.   Neurological:      Mental Status: She is alert and oriented to person, place, and time.   Psychiatric:         Attention and Perception: Attention normal.         Mood and Affect: Mood normal.         Speech: Speech normal.         Behavior: Behavior is cooperative.         Labs/Imaging:    CT Angiogram Chest With & Without Contrast    Result Date: 9/26/2021  Persistent stable appearance of the ascending thoracic aortic aneurysm with fusiform aneurysmal dilatation of to 4.6 cm unchanged from 06/16/2021 comparison examination. No acute findings otherwise specifically no dissection or periaortic inflammation  DICTATED:   09/25/2021 EDITED/ls :   09/25/2021   This report was finalized on 9/26/2021 6:06 PM by Dr. Amadou Rodgers.       Personal reviewed      Time Spent: I spent 29 minutes caring for Blanca on this date of service. This time includes time spent by me in the following activities: preparing for the visit, reviewing tests, obtaining and/or reviewing a separately obtained history, performing a medically appropriate examination and/or evaluation, counseling and educating the patient/family/caregiver, documenting information in the medical record and independently interpreting results and communicating that information with the patient/family/caregiver.     Assessment / Plan:  Diagnoses and all orders for this visit:    1. Thoracic aortic aneurysm without rupture (HCC) (Primary)     Blanca Sparrow is a 76 y.o. female with a history of hypertension and ascending aortic aneurysm.  Discussed findings of CTA chest with interval enlargement of  her ascending thoracic aortic aneurysm from 4.2 cm in 2019 to 4.6 cm on recent CTA.  She denies any current chest pain or unusual back or flank pain.  Have discussed with her that Dr. Stringer does not surgically intervene on ascending aortic aneurysms until the size reaches approximately 5.5 cm.  Educated on the importance of tight blood pressure control.  She is following closely with Dr. Wagner and BP's have been well controlled.  With recent enlargement over 18 months, will follow up in 1 year with repeat CTA of the chest.    BIRGIT Garcia  [facility] Cardiothoracic Surgery

## 2021-11-04 ENCOUNTER — APPOINTMENT (OUTPATIENT)
Dept: MAMMOGRAPHY | Facility: HOSPITAL | Age: 76
End: 2021-11-04

## 2021-11-08 ENCOUNTER — HOSPITAL ENCOUNTER (OUTPATIENT)
Dept: MAMMOGRAPHY | Facility: HOSPITAL | Age: 76
Discharge: HOME | End: 2021-11-08
Admitting: INTERNAL MEDICINE

## 2021-11-08 DIAGNOSIS — Z12.31 VISIT FOR SCREENING MAMMOGRAM: ICD-10-CM

## 2021-11-08 PROCEDURE — 77063 BREAST TOMOSYNTHESIS BI: CPT | Performed by: RADIOLOGY

## 2021-11-08 PROCEDURE — 77067 SCR MAMMO BI INCL CAD: CPT | Performed by: RADIOLOGY

## 2021-11-08 PROCEDURE — 77067 SCR MAMMO BI INCL CAD: CPT

## 2021-11-08 PROCEDURE — 77063 BREAST TOMOSYNTHESIS BI: CPT

## 2022-01-10 ENCOUNTER — TELEPHONE (OUTPATIENT)
Dept: ONCOLOGY | Facility: CLINIC | Age: 77
End: 2022-01-10

## 2022-01-10 NOTE — TELEPHONE ENCOUNTER
Caller: Blanca Sparrow    Relationship to patient: Self    Best call back number: [phone]    Chief complaint: PATIENT WANTED TO KNOW IF THERE WAS ANY APPT'S AVAILABLE THIS WEEK    Type of visit: NEW      If rescheduling, when is the original appointment: 1-17-22

## 2022-01-10 NOTE — TELEPHONE ENCOUNTER
Called patient back to let her know there are not any openings this week, but I added her to the appointment wait list.

## 2022-01-17 ENCOUNTER — LAB (OUTPATIENT)
Dept: LAB | Facility: HOSPITAL | Age: 77
End: 2022-01-17

## 2022-01-17 ENCOUNTER — CONSULT (OUTPATIENT)
Dept: ONCOLOGY | Facility: CLINIC | Age: 77
End: 2022-01-17

## 2022-01-17 VITALS
TEMPERATURE: 97.1 F | SYSTOLIC BLOOD PRESSURE: 151 MMHG | BODY MASS INDEX: 38.14 KG/M2 | WEIGHT: 243 LBS | HEART RATE: 98 BPM | RESPIRATION RATE: 18 BRPM | OXYGEN SATURATION: 98 % | HEIGHT: 67 IN | DIASTOLIC BLOOD PRESSURE: 72 MMHG

## 2022-01-17 DIAGNOSIS — D64.9 ANEMIA, UNSPECIFIED TYPE: Primary | ICD-10-CM

## 2022-01-17 DIAGNOSIS — D69.6 THROMBOCYTOPENIA: ICD-10-CM

## 2022-01-17 DIAGNOSIS — D64.9 ANEMIA, UNSPECIFIED TYPE: ICD-10-CM

## 2022-01-17 LAB
ALBUMIN SERPL-MCNC: 4.1 G/DL (ref 3.5–5.2)
ALBUMIN/GLOB SERPL: 1.4 G/DL
ALP SERPL-CCNC: 92 U/L (ref 39–117)
ALT SERPL W P-5'-P-CCNC: 5 U/L (ref 1–33)
ANION GAP SERPL CALCULATED.3IONS-SCNC: 9 MMOL/L (ref 5–15)
AST SERPL-CCNC: 13 U/L (ref 1–32)
BILIRUB SERPL-MCNC: 0.3 MG/DL (ref 0–1.2)
BUN SERPL-MCNC: 13 MG/DL (ref 8–23)
BUN/CREAT SERPL: 15.1 (ref 7–25)
CALCIUM SPEC-SCNC: 9.2 MG/DL (ref 8.6–10.5)
CHLORIDE SERPL-SCNC: 104 MMOL/L (ref 98–107)
CO2 SERPL-SCNC: 25 MMOL/L (ref 22–29)
CREAT SERPL-MCNC: 0.86 MG/DL (ref 0.57–1)
DAT POLY-SP REAG RBC QL: NEGATIVE
ERYTHROCYTE [DISTWIDTH] IN BLOOD BY AUTOMATED COUNT: 16.4 % (ref 12.3–15.4)
GFR SERPL CREATININE-BSD FRML MDRD: 65 ML/MIN/1.73
GLOBULIN UR ELPH-MCNC: 2.9 GM/DL
GLUCOSE SERPL-MCNC: 92 MG/DL (ref 65–99)
HAPTOGLOB SERPL-MCNC: 138 MG/DL (ref 30–200)
HCT VFR BLD AUTO: 32.1 % (ref 34–46.6)
HGB BLD-MCNC: 10.3 G/DL (ref 12–15.9)
LDH SERPL-CCNC: 208 U/L (ref 135–214)
LYMPHOCYTES # BLD AUTO: 1.8 10*3/MM3 (ref 0.7–3.1)
LYMPHOCYTES NFR BLD AUTO: 43.8 % (ref 19.6–45.3)
MCH RBC QN AUTO: 34.2 PG (ref 26.6–33)
MCHC RBC AUTO-ENTMCNC: 32.1 G/DL (ref 31.5–35.7)
MCV RBC AUTO: 106.7 FL (ref 79–97)
MONOCYTES # BLD AUTO: 0.2 10*3/MM3 (ref 0.1–0.9)
MONOCYTES NFR BLD AUTO: 5.9 % (ref 5–12)
NEUTROPHILS NFR BLD AUTO: 2.1 10*3/MM3 (ref 1.7–7)
NEUTROPHILS NFR BLD AUTO: 50.3 % (ref 42.7–76)
PLATELET # BLD AUTO: 59 10*3/MM3 (ref 140–450)
PMV BLD AUTO: 6.5 FL (ref 6–12)
POTASSIUM SERPL-SCNC: 3.9 MMOL/L (ref 3.5–5.2)
PROT SERPL-MCNC: 7 G/DL (ref 6–8.5)
RBC # BLD AUTO: 3.01 10*6/MM3 (ref 3.77–5.28)
RETICS # AUTO: 0.06 10*6/MM3 (ref 0.02–0.13)
RETICS/RBC NFR AUTO: 2.19 % (ref 0.7–1.9)
SODIUM SERPL-SCNC: 138 MMOL/L (ref 136–145)
WBC NRBC COR # BLD: 4.2 10*3/MM3 (ref 3.4–10.8)

## 2022-01-17 PROCEDURE — 80053 COMPREHEN METABOLIC PANEL: CPT

## 2022-01-17 PROCEDURE — 84630 ASSAY OF ZINC: CPT

## 2022-01-17 PROCEDURE — 99204 OFFICE O/P NEW MOD 45 MIN: CPT | Performed by: INTERNAL MEDICINE

## 2022-01-17 PROCEDURE — 83010 ASSAY OF HAPTOGLOBIN QUANT: CPT

## 2022-01-17 PROCEDURE — 83615 LACTATE (LD) (LDH) ENZYME: CPT

## 2022-01-17 PROCEDURE — 85060 BLOOD SMEAR INTERPRETATION: CPT

## 2022-01-17 PROCEDURE — 85025 COMPLETE CBC W/AUTO DIFF WBC: CPT

## 2022-01-17 PROCEDURE — 85045 AUTOMATED RETICULOCYTE COUNT: CPT

## 2022-01-17 PROCEDURE — 82525 ASSAY OF COPPER: CPT

## 2022-01-17 PROCEDURE — 36415 COLL VENOUS BLD VENIPUNCTURE: CPT

## 2022-01-17 PROCEDURE — 86880 COOMBS TEST DIRECT: CPT

## 2022-01-17 RX ORDER — NITROFURANTOIN 25; 75 MG/1; MG/1
CAPSULE ORAL EVERY 12 HOURS SCHEDULED
COMMUNITY
End: 2022-02-09

## 2022-01-17 RX ORDER — CETIRIZINE HYDROCHLORIDE 10 MG/1
10 TABLET ORAL DAILY
COMMUNITY
Start: 2021-12-22

## 2022-01-17 RX ORDER — HYDROCORTISONE BUTYRATE 0.1 %
CREAM (GRAM) TOPICAL
COMMUNITY
End: 2023-01-19

## 2022-01-17 RX ORDER — OLMESARTAN MEDOXOMIL 20 MG/1
TABLET ORAL
COMMUNITY
Start: 2021-11-01 | End: 2022-02-09

## 2022-01-17 NOTE — PROGRESS NOTES
New Patient Office Visit      Date: 2022     Patient Name: Blanca Sparrow  MRN: [id number]  : 1945  Referring Physician: Josiah Wagner    Chief Complaint: Establish care for anemia and thrombocytopenia    History of Present Illness: Blanca Sparrow is a pleasant 76 y.o. female with past medical history of hypertension, GERD, hyperlipidemia, seasonal allergies, thoracic aortic aneurysm who presents today for evaluation of anemia and thrombocytopenia. The patient states that over the past few months she has been having worsening fatigue.  Denies any unexplained fevers, chills, weight loss, night sweats.  She was seen by her PCP at the beginning of January for routine health evaluation.  At that time she had labs checked which were notable for hemoglobin of 9.7 and a platelet count of 40K.  Previously she had labs in 2020 which showed hemoglobin of 12 and a platelet count of 150K.  WBC has been within normal limits.  Iron studies, folate, vitamin B12, SPEP with immunofixation were within normal limits.  She denies any family history of leukemia or lymphoma.  She denies any easy bleeding or bruising episodes.  Does note that she had a viral illness in 2021.  States that she has had multiple COVID-19 test which have all been negative.  She does note receiving the vaccine in January/2021 and her booster in 2021.  She otherwise denies any new medication changes    Oncology History:    Oncology/Hematology History    No history exists.       Subjective      Review of Systems:     Constitutional: Negative for fevers, chills, or weight loss  Eyes: Negative for blurred vision or discharge         Ear/Nose/Throat: Negative for difficulty swallowing, sore throat, LAD                                                       Respiratory: Negative for cough, SOA, wheezing                                                                                        Cardiovascular: Negative for  chest pain or palpitations                                                                  Gastrointestinal: Negative for nausea, vomiting or diarrhea                                                                     Genitourinary: Negative for dysuria or hematuria                                                                                           Musculoskeletal: Negative for any joint pains or muscle aches                                                                        Neurologic: Negative for any weakness, headaches, dizziness                                                                         Hematologic: Negative for any easy bleeding or bruising                                                                                   Psychiatric: Negative for anxiety or depression                             Past Medical History:   Past Medical History:   Diagnosis Date   • Aortic aneurysm, abdominal (HCC)    • Arthritis    • Hypertension        Past Surgical History:   Past Surgical History:   Procedure Laterality Date   • ACHILLES TENDON SURGERY     • BREAST BIOPSY Right     BENIGN CORE BX, PT UNSURE OF DATE   • HYSTERECTOMY     • OOPHORECTOMY     • OTHER SURGICAL HISTORY      APPEY   • SKIN BIOPSY Right 08/2021    Upper Arm   • SKIN CANCER EXCISION     • TONSILLECTOMY     • TOTAL HIP ARTHROPLASTY REVISION Right    • TOTAL KNEE ARTHROPLASTY Left        Family History:   Family History   Problem Relation Age of Onset   • Heart disease Mother    • Osteoarthritis Mother    • Breast cancer Mother 50   • Heart disease Father    • Diabetes Father    • Stroke Father    • Breast cancer Sister 38   • Ovarian cancer Maternal Grandmother         ?       Social History:   Social History     Socioeconomic History   • Marital status:    • Number of children: 1   Tobacco Use   • Smoking status: Former Smoker     Packs/day: 1.00     Years: 13.00     Pack years: 13.00     Types: Cigarettes     Start date:       Quit date:      Years since quittin.0   • Smokeless tobacco: Never Used   • Tobacco comment: Pt used nicoret since quit smoking    Vaping Use   • Vaping Use: Never used   Substance and Sexual Activity   • Alcohol use: No   • Drug use: No   • Sexual activity: Never       Medications:     Current Outpatient Medications:   •  aspirin 81 MG EC tablet, Take 81 mg by mouth Daily., Disp: , Rfl:   •  Calcium Carbonate (Caltrate 600) 1500 (600 Ca) MG tablet, Take  by mouth Daily., Disp: , Rfl:   •  Calcium Carbonate-Vitamin D (CALCIUM + D PO), Take  by mouth daily., Disp: , Rfl:   •  celecoxib (CeleBREX) 200 MG capsule, TAKE 1 CAPSULE EVERY DAY, Disp: , Rfl: 3  •  cetirizine (zyrTEC) 10 MG tablet, Take 10 mg by mouth Daily., Disp: , Rfl:   •  Cholecalciferol (VITAMIN D-3 PO), Take 5,000 Units by mouth daily., Disp: , Rfl:   •  cyclobenzaprine (FLEXERIL) 10 MG tablet, Take 10 mg by mouth 3 (Three) Times a Day As Needed., Disp: , Rfl: 3  •  hydrocortisone butyrate (LOCOID) 0.1 % cream, APPLY THIN LAYER TOPICALLY TO THE AFFECTED AREA TWICE DAILY, Disp: , Rfl:   •  montelukast (SINGULAIR) 10 MG tablet, Take 10 mg by mouth daily., Disp: , Rfl:   •  niacinamide 500 MG tablet, 100 mg Daily., Disp: , Rfl:   •  nitrofurantoin, macrocrystal-monohydrate, (Macrobid) 100 MG capsule, Every 12 (Twelve) Hours., Disp: , Rfl:   •  olmesartan (BENICAR) 20 MG tablet, , Disp: , Rfl:   •  Olmesartan Medoxomil (BENICAR PO), Take 30 mg by mouth Daily., Disp: , Rfl:   •  omeprazole (PriLOSEC) 40 MG capsule, Take 40 mg by mouth daily., Disp: , Rfl:   •  pravastatin (PRAVACHOL) 40 MG tablet, Take 40 mg by mouth Daily., Disp: , Rfl:   •  raloxifene (EVISTA) 60 MG tablet, Take 60 mg by mouth daily., Disp: , Rfl:   •  raNITIdine (ZANTAC) 300 MG tablet, Take 300 mg by mouth Every Night., Disp: , Rfl:   •  traZODone (DESYREL) 50 MG tablet, trazodone 50 mg tablet, Disp: , Rfl:   •  venlafaxine XR (EFFEXOR-XR) 150 MG 24 hr capsule, Take 150  "mg by mouth daily., Disp: , Rfl:     Allergies:   No Known Allergies    Objective     Physical Exam:  Vital Signs:   Vitals:    01/17/22 1358   BP: 151/72   Pulse: 98   Resp: 18   Temp: 97.1 °F (36.2 °C)   TempSrc: Temporal   SpO2: 98%   Weight: 110 kg (243 lb)   Height: 170.2 cm (67.01\")   PainSc: 0-No pain     Pain Score    01/17/22 1358   PainSc: 0-No pain     ECOG Performance Status: 0 - Asymptomatic    Constitutional: NAD, ECOG 0  Eyes: PERRLA, scleral anicteric  ENT: No LAD, no thyromegaly  Respiratory: CTAB, no wheezing, rales, rhonchi  Cardiovascular: RRR, no murmurs, pulses 2+ bilaterally  Abdomen: soft, NT/ND, no HSM  Musculoskeletal: strength 5/5 bilaterally, no c/c/e  Neurologic: A&O x 3, CN II-XII intact grossly  Psych: mood and affect congruent, no SI or HI    Results Review:   Lab on 01/17/2022   Component Date Value Ref Range Status   • WBC 01/17/2022 4.20  3.40 - 10.80 10*3/mm3 Final   • RBC 01/17/2022 3.01* 3.77 - 5.28 10*6/mm3 Final   • Hemoglobin 01/17/2022 10.3* 12.0 - 15.9 g/dL Final   • Hematocrit 01/17/2022 32.1* 34.0 - 46.6 % Final   • RDW 01/17/2022 16.4* 12.3 - 15.4 % Final   • MCV 01/17/2022 106.7* 79.0 - 97.0 fL Final   • MCH 01/17/2022 34.2* 26.6 - 33.0 pg Final   • MCHC 01/17/2022 32.1  31.5 - 35.7 g/dL Final   • MPV 01/17/2022 6.5  6.0 - 12.0 fL Final   • Platelets 01/17/2022 59* 140 - 450 10*3/mm3 Final    Verified by repeat analysis.    • Neutrophil % 01/17/2022 50.3  42.7 - 76.0 % Final   • Lymphocyte % 01/17/2022 43.8  19.6 - 45.3 % Final   • Monocyte % 01/17/2022 5.9  5.0 - 12.0 % Final   • Neutrophils, Absolute 01/17/2022 2.10  1.70 - 7.00 10*3/mm3 Final   • Lymphocytes, Absolute 01/17/2022 1.80  0.70 - 3.10 10*3/mm3 Final   • Monocytes, Absolute 01/17/2022 0.20  0.10 - 0.90 10*3/mm3 Final       No results found.    Assessment / Plan      Assessment/Plan:   1. Anemia, unspecified type (Primary)  -Unclear etiology at this time.  Differential including autoimmune hemolysis, " mineral deficiency, medication  -Hemoglobin 9.7 in January 2022.  Previously 12 December 2020  - Review of outside records notable for normal iron studies, jordin B12, folate, SPEP  -We will check labs as below to rule out a secondary causes  - Depending on results, will consider bone marrow biopsy  -     CBC & Differential; Future  -     Comprehensive Metabolic Panel; Future  -     Lactate Dehydrogenase; Future  -     Haptoglobin; Future  -     Cancel: Vitamin B12; Future  -     Cancel: Folate; Future  -     Peripheral Blood Smear; Future  -     Reticulocytes; Future  -     Direct Antiglobulin Test (Direct [employer]); Future  -     Zinc; Future  -     Copper, Serum; Future    2. Thrombocytopenia (HCC)  -Unclear etiology  - Platelet count 40K in January 2022  - We will check labs as above and ultrasound abdomen  -     US Abdomen Complete; Future       Follow Up:   Follow-up in 2 weeks     Braydon Espinal MD  Hematology and Oncology     Please note that portions of this note may have been completed with a voice recognition program. Efforts were made to edit the dictations, but occasionally words are mistranscribed.

## 2022-01-18 LAB
CYTOLOGIST CVX/VAG CYTO: NORMAL
PATH INTERP BLD-IMP: NORMAL

## 2022-01-21 ENCOUNTER — HOSPITAL ENCOUNTER (OUTPATIENT)
Dept: ULTRASOUND IMAGING | Facility: HOSPITAL | Age: 77
Discharge: HOME | End: 2022-01-21
Admitting: INTERNAL MEDICINE

## 2022-01-21 DIAGNOSIS — D69.6 THROMBOCYTOPENIA: ICD-10-CM

## 2022-01-21 LAB
COPPER SERPL-MCNC: 172 UG/DL (ref 80–158)
ZINC SERPL-MCNC: 73 UG/DL (ref 44–115)

## 2022-01-21 PROCEDURE — 76700 US EXAM ABDOM COMPLETE: CPT

## 2022-01-26 ENCOUNTER — TELEPHONE (OUTPATIENT)
Dept: ONCOLOGY | Facility: CLINIC | Age: 77
End: 2022-01-26

## 2022-01-26 NOTE — TELEPHONE ENCOUNTER
Provider: DR PETERSON    Caller: SEBASTIAN    Relationship to Patient: SELF    Reason for Call: SEBASTIAN IS CALLING AND WOULD LIKE TO KNOW IF SHE CAN BRING SOMEONE TO HER APPT ON 1-31.    IF UNAVAILABLE TO ANSWER , PLEASE LEAVE MESSAGE.    PLEASE ADVISE

## 2022-01-31 ENCOUNTER — OFFICE VISIT (OUTPATIENT)
Dept: ONCOLOGY | Facility: CLINIC | Age: 77
End: 2022-01-31

## 2022-01-31 VITALS
WEIGHT: 245.5 LBS | SYSTOLIC BLOOD PRESSURE: 146 MMHG | DIASTOLIC BLOOD PRESSURE: 88 MMHG | BODY MASS INDEX: 38.53 KG/M2 | HEART RATE: 95 BPM | HEIGHT: 67 IN | TEMPERATURE: 97.8 F | OXYGEN SATURATION: 97 % | RESPIRATION RATE: 20 BRPM

## 2022-01-31 DIAGNOSIS — D69.6 THROMBOCYTOPENIA: Primary | ICD-10-CM

## 2022-01-31 PROCEDURE — 99214 OFFICE O/P EST MOD 30 MIN: CPT | Performed by: INTERNAL MEDICINE

## 2022-01-31 NOTE — PROGRESS NOTES
Follow Up Office Visit      Date: 2022     Patient Name: Blanca Sparrow  MRN: [id number]  : 1945  Referring Physician: Josiah Wagner     Chief Complaint:  Follow-up for anemia and thrombocytopenia     History of Present Illness: Blanca Sparrow is a pleasant 76 y.o. female with past medical history of hypertension, GERD, hyperlipidemia, seasonal allergies, thoracic aortic aneurysm who presents today for evaluation of anemia and thrombocytopenia. The patient states that over the past few months she has been having worsening fatigue.  Denies any unexplained fevers, chills, weight loss, night sweats.  She was seen by her PCP at the beginning of January for routine health evaluation.  At that time she had labs checked which were notable for hemoglobin of 9.7 and a platelet count of 40K.  Previously she had labs in 2020 which showed hemoglobin of 12 and a platelet count of 150K.  WBC has been within normal limits.  Iron studies, folate, vitamin B12, SPEP with immunofixation were within normal limits.  She denies any family history of leukemia or lymphoma.  She denies any easy bleeding or bruising episodes.  Does note that she had a viral illness in 2021.  States that she has had multiple COVID-19 test which have all been negative.  She does note receiving the vaccine in 2021 and her booster in 2021.  She otherwise denies any new medication changes    Interval History:  Presents clinic for follow-up.  Overall continues to do well.  Denies any easy bleeding or bruising episodes.  Remains compliant with her home medications    Oncology History:    Oncology/Hematology History    No history exists.       Subjective      Review of Systems:   Constitutional: Negative for fevers, chills, or weight loss  Eyes: Negative for blurred vision or discharge         Ear/Nose/Throat: Negative for difficulty swallowing, sore throat, LAD                                                        Respiratory: Negative for cough, SOA, wheezing                                                                                        Cardiovascular: Negative for chest pain or palpitations                                                                  Gastrointestinal: Negative for nausea, vomiting or diarrhea                                                                     Genitourinary: Negative for dysuria or hematuria                                                                                           Musculoskeletal: Negative for any joint pains or muscle aches                                                                        Neurologic: Negative for any weakness, headaches, dizziness                                                                         Hematologic: Negative for any easy bleeding or bruising                                                                                   Psychiatric: Negative for anxiety or depression                          Past Medical History/Past Surgical History/ Family History/ Social History: Reviewed by me and unchanged from my previous documentation done on January 2022.     Medications:     Current Outpatient Medications:   •  aspirin 81 MG EC tablet, Take 81 mg by mouth Daily., Disp: , Rfl:   •  Calcium Carbonate (Caltrate 600) 1500 (600 Ca) MG tablet, Take  by mouth Daily., Disp: , Rfl:   •  Calcium Carbonate-Vitamin D (CALCIUM + D PO), Take  by mouth daily., Disp: , Rfl:   •  celecoxib (CeleBREX) 200 MG capsule, TAKE 1 CAPSULE EVERY DAY, Disp: , Rfl: 3  •  cetirizine (zyrTEC) 10 MG tablet, Take 10 mg by mouth Daily., Disp: , Rfl:   •  Cholecalciferol (VITAMIN D-3 PO), Take 5,000 Units by mouth daily., Disp: , Rfl:   •  cyclobenzaprine (FLEXERIL) 10 MG tablet, Take 10 mg by mouth 3 (Three) Times a Day As Needed., Disp: , Rfl: 3  •  hydrocortisone butyrate (LOCOID) 0.1 % cream, APPLY THIN LAYER TOPICALLY TO THE AFFECTED AREA TWICE DAILY,  "Disp: , Rfl:   •  montelukast (SINGULAIR) 10 MG tablet, Take 10 mg by mouth daily., Disp: , Rfl:   •  niacinamide 500 MG tablet, 100 mg Daily., Disp: , Rfl:   •  nitrofurantoin, macrocrystal-monohydrate, (Macrobid) 100 MG capsule, Every 12 (Twelve) Hours., Disp: , Rfl:   •  olmesartan (BENICAR) 20 MG tablet, , Disp: , Rfl:   •  Olmesartan Medoxomil (BENICAR PO), Take 30 mg by mouth Daily., Disp: , Rfl:   •  omeprazole (PriLOSEC) 40 MG capsule, Take 40 mg by mouth daily., Disp: , Rfl:   •  pravastatin (PRAVACHOL) 40 MG tablet, Take 40 mg by mouth Daily., Disp: , Rfl:   •  raloxifene (EVISTA) 60 MG tablet, Take 60 mg by mouth daily., Disp: , Rfl:   •  raNITIdine (ZANTAC) 300 MG tablet, Take 300 mg by mouth Every Night., Disp: , Rfl:   •  traZODone (DESYREL) 50 MG tablet, trazodone 50 mg tablet, Disp: , Rfl:   •  venlafaxine XR (EFFEXOR-XR) 150 MG 24 hr capsule, Take 150 mg by mouth daily., Disp: , Rfl:     Allergies:   No Known Allergies    Objective     Physical Exam:  Vital Signs:   Vitals:    01/31/22 0839   BP: 146/88   Pulse: 95   Resp: 20   Temp: 97.8 °F (36.6 °C)   TempSrc: Temporal   SpO2: 97%   Weight: 111 kg (245 lb 8 oz)   Height: 170.2 cm (67\")   PainSc: 0-No pain     Pain Score    01/31/22 0839   PainSc: 0-No pain     ECOG Performance Status: 0 - Asymptomatic    Constitutional: NAD, ECOG 0  Eyes: PERRLA, scleral anicteric  ENT: No LAD, no thyromegaly  Respiratory: CTAB, no wheezing, rales, rhonchi  Cardiovascular: RRR, no murmurs, pulses 2+ bilaterally  Abdomen: soft, NT/ND, no HSM  Musculoskeletal: strength 5/5 bilaterally, no c/c/e  Neurologic: A&O x 3, CN II-XII intact grossly    Results Review:   No visits with results within 2 Week(s) from this visit.   Latest known visit with results is:   Lab on 01/17/2022   Component Date Value Ref Range Status   • Glucose 01/17/2022 92  65 - 99 mg/dL Final   • BUN 01/17/2022 13  8 - 23 mg/dL Final   • Creatinine 01/17/2022 0.86  0.57 - 1.00 mg/dL Final   • Sodium " 01/17/2022 138  136 - 145 mmol/L Final   • Potassium 01/17/2022 3.9  3.5 - 5.2 mmol/L Final   • Chloride 01/17/2022 104  98 - 107 mmol/L Final   • CO2 01/17/2022 25.0  22.0 - 29.0 mmol/L Final   • Calcium 01/17/2022 9.2  8.6 - 10.5 mg/dL Final   • Total Protein 01/17/2022 7.0  6.0 - 8.5 g/dL Final   • Albumin 01/17/2022 4.10  3.50 - 5.20 g/dL Final   • ALT (SGPT) 01/17/2022 5  1 - 33 U/L Final   • AST (SGOT) 01/17/2022 13  1 - 32 U/L Final   • Alkaline Phosphatase 01/17/2022 92  39 - 117 U/L Final   • Total Bilirubin 01/17/2022 0.3  0.0 - 1.2 mg/dL Final   • eGFR Non  Amer 01/17/2022 65  >60 mL/min/1.73 Final   • Globulin 01/17/2022 2.9  gm/dL Final    Calculated Result   • A/G Ratio 01/17/2022 1.4  g/dL Final   • BUN/Creatinine Ratio 01/17/2022 15.1  7.0 - 25.0 Final   • Anion Gap 01/17/2022 9.0  5.0 - 15.0 mmol/L Final   • LDH 01/17/2022 208  135 - 214 U/L Final   • Haptoglobin 01/17/2022 138  30 - 200 mg/dL Final   • Performed by: 01/17/2022 Dr. Humberto Stone M.D.   Final   • Pathologist Interpretation 01/17/2022 Thrombocytopenia   Final   • Reticulocyte % 01/17/2022 2.19* 0.70 - 1.90 % Final   • Reticulocyte Absolute 01/17/2022 0.0646  0.0200 - 0.1300 10*6/mm3 Final   • SAM 01/17/2022 Negative   Final   • Zinc 01/17/2022 73  44 - 115 ug/dL Final                                    Detection Limit = 5   • Copper 01/17/2022 172* 80 - 158 ug/dL Final                                    Detection Limit = 5   • WBC 01/17/2022 4.20  3.40 - 10.80 10*3/mm3 Final   • RBC 01/17/2022 3.01* 3.77 - 5.28 10*6/mm3 Final   • Hemoglobin 01/17/2022 10.3* 12.0 - 15.9 g/dL Final   • Hematocrit 01/17/2022 32.1* 34.0 - 46.6 % Final   • RDW 01/17/2022 16.4* 12.3 - 15.4 % Final   • MCV 01/17/2022 106.7* 79.0 - 97.0 fL Final   • MCH 01/17/2022 34.2* 26.6 - 33.0 pg Final   • MCHC 01/17/2022 32.1  31.5 - 35.7 g/dL Final   • MPV 01/17/2022 6.5  6.0 - 12.0 fL Final   • Platelets 01/17/2022 59* 140 - 450 10*3/mm3 Final    Verified by  repeat analysis.    • Neutrophil % 01/17/2022 50.3  42.7 - 76.0 % Final   • Lymphocyte % 01/17/2022 43.8  19.6 - 45.3 % Final   • Monocyte % 01/17/2022 5.9  5.0 - 12.0 % Final   • Neutrophils, Absolute 01/17/2022 2.10  1.70 - 7.00 10*3/mm3 Final   • Lymphocytes, Absolute 01/17/2022 1.80  0.70 - 3.10 10*3/mm3 Final   • Monocytes, Absolute 01/17/2022 0.20  0.10 - 0.90 10*3/mm3 Final       US Abdomen Complete    Result Date: 1/21/2022  Narrative: EXAMINATION: US ABDOMEN COMPLETE- 01/21/2022  INDICATION: thrombocytopenia, rule out liver/spleen disease; D69.6-Thrombocytopenia, unspecified  TECHNIQUE: Ultrasound abdomen complete  COMPARISON: NONE  FINDINGS:  Visualized portions of the aorta and IVC unremarkable.  Visualized portions of the pancreas within normal limits.  Liver demonstrates homogeneous liver parenchyma without focal liver lesion.  Gallbladder is present without cholelithiasis, gallbladder wall thickening or pericholecystic fluid.  Common bile duct measures 7 mm in diameter within normal limits.  Right kidney 13.4 cm in length without evidence of hydronephrosis, contour deforming mass or obvious calculi.  Left kidney measures 12.6 cm in length containing a single 4.9 cm renal cortical cyst.  Spleen measures 10.1 cm in length.      Impression: Normal ultrasound abdomen.  D:  01/21/2022 E:  01/21/2022    This report was finalized on 1/21/2022 11:53 AM by Dr. Amadou Rodgers.        Assessment / Plan      Assessment/Plan:   1. Anemia, unspecified type (Primary)  -Unclear etiology at this time.  Differential including autoimmune hemolysis, mineral deficiency, medication  -Hemoglobin 9.7 in January 2022.  Previously 12 December 2020  -Review of outside records notable for normal iron studies, jordin B12, folate, SPEP  -LDH, haptoglobin, SAM within normal limits  -Copper, zinc levels within normal limits  -Peripheral smear without evidence of schistocytes  -Retake count mildly elevated  -We will plan for bone marrow  biopsy to rule out MDS vs ITP     2. Thrombocytopenia (HCC)  -Unclear etiology although likely secondary to ITP  -Platelet count 40K in January 2022  -Repeat platelet count 59K  -Ultrasound abdomen showing no evidence of splenomegaly or fatty liver disease  -Plan for bone marrow biopsy as above      Follow Up:   Follow-up 1 week after bone marrow biopsy     Braydon Espinal MD  Hematology and Oncology     Please note that portions of this note may have been completed with a voice recognition program. Efforts were made to edit the dictations, but occasionally words are mistranscribed.

## 2022-02-01 ENCOUNTER — TELEPHONE (OUTPATIENT)
Dept: ONCOLOGY | Facility: CLINIC | Age: 77
End: 2022-02-01

## 2022-02-01 NOTE — TELEPHONE ENCOUNTER
Anuradha from Dr. Josiah Wagner office is calling about this patient and wanting to clarify patients copper level results. She can be reached at [phone].

## 2022-02-01 NOTE — TELEPHONE ENCOUNTER
Returned call to Anuradha at Dr. King office. Informing her of Copper results being 172. Informing MA to call with any questions.

## 2022-02-10 ENCOUNTER — HOSPITAL ENCOUNTER (OUTPATIENT)
Dept: CT IMAGING | Facility: HOSPITAL | Age: 77
Discharge: HOME | End: 2022-02-10
Admitting: RADIOLOGY

## 2022-02-10 VITALS
TEMPERATURE: 97.5 F | OXYGEN SATURATION: 96 % | HEART RATE: 88 BPM | RESPIRATION RATE: 16 BRPM | SYSTOLIC BLOOD PRESSURE: 110 MMHG | WEIGHT: 241.4 LBS | HEIGHT: 67 IN | BODY MASS INDEX: 37.89 KG/M2 | DIASTOLIC BLOOD PRESSURE: 72 MMHG

## 2022-02-10 DIAGNOSIS — D69.6 THROMBOCYTOPENIA: ICD-10-CM

## 2022-02-10 LAB
APTT PPP: 28.2 SECONDS (ref 22–39)
BASOPHILS # BLD AUTO: 0 10*3/MM3 (ref 0–0.2)
BASOPHILS NFR BLD AUTO: 0 % (ref 0–1.5)
DEPRECATED RDW RBC AUTO: 55.4 FL (ref 37–54)
EOSINOPHIL # BLD AUTO: 0.07 10*3/MM3 (ref 0–0.4)
EOSINOPHIL NFR BLD AUTO: 1.7 % (ref 0.3–6.2)
ERYTHROCYTE [DISTWIDTH] IN BLOOD BY AUTOMATED COUNT: 14 % (ref 12.3–15.4)
HCT VFR BLD AUTO: 31.7 % (ref 34–46.6)
HGB BLD-MCNC: 10.4 G/DL (ref 12–15.9)
IMM GRANULOCYTES # BLD AUTO: 0.02 10*3/MM3 (ref 0–0.05)
IMM GRANULOCYTES NFR BLD AUTO: 0.5 % (ref 0–0.5)
INR PPP: 1.03 (ref 0.85–1.16)
LYMPHOCYTES # BLD AUTO: 1.74 10*3/MM3 (ref 0.7–3.1)
LYMPHOCYTES NFR BLD AUTO: 42.2 % (ref 19.6–45.3)
MCH RBC QN AUTO: 35.3 PG (ref 26.6–33)
MCHC RBC AUTO-ENTMCNC: 32.8 G/DL (ref 31.5–35.7)
MCV RBC AUTO: 107.5 FL (ref 79–97)
MONOCYTES # BLD AUTO: 0.37 10*3/MM3 (ref 0.1–0.9)
MONOCYTES NFR BLD AUTO: 9 % (ref 5–12)
NEUTROPHILS NFR BLD AUTO: 1.92 10*3/MM3 (ref 1.7–7)
NEUTROPHILS NFR BLD AUTO: 46.6 % (ref 42.7–76)
NRBC BLD AUTO-RTO: 0 /100 WBC (ref 0–0.2)
PLATELET # BLD AUTO: 45 10*3/MM3 (ref 140–450)
PMV BLD AUTO: 10.9 FL (ref 6–12)
PROTHROMBIN TIME: 13.2 SECONDS (ref 11.4–14.4)
RBC # BLD AUTO: 2.95 10*6/MM3 (ref 3.77–5.28)
RBC MORPH BLD: NORMAL
SMALL PLATELETS BLD QL SMEAR: NORMAL
WBC MORPH BLD: NORMAL
WBC NRBC COR # BLD: 4.12 10*3/MM3 (ref 3.4–10.8)

## 2022-02-10 PROCEDURE — 85025 COMPLETE CBC W/AUTO DIFF WBC: CPT | Performed by: RADIOLOGY

## 2022-02-10 PROCEDURE — 99152 MOD SED SAME PHYS/QHP 5/>YRS: CPT

## 2022-02-10 PROCEDURE — 88313 SPECIAL STAINS GROUP 2: CPT | Performed by: INTERNAL MEDICINE

## 2022-02-10 PROCEDURE — 25010000002 MIDAZOLAM PER 1 MG: Performed by: RADIOLOGY

## 2022-02-10 PROCEDURE — 88305 TISSUE EXAM BY PATHOLOGIST: CPT | Performed by: INTERNAL MEDICINE

## 2022-02-10 PROCEDURE — 85007 BL SMEAR W/DIFF WBC COUNT: CPT | Performed by: RADIOLOGY

## 2022-02-10 PROCEDURE — 88342 IMHCHEM/IMCYTCHM 1ST ANTB: CPT | Performed by: INTERNAL MEDICINE

## 2022-02-10 PROCEDURE — 85730 THROMBOPLASTIN TIME PARTIAL: CPT | Performed by: RADIOLOGY

## 2022-02-10 PROCEDURE — 88311 DECALCIFY TISSUE: CPT | Performed by: INTERNAL MEDICINE

## 2022-02-10 PROCEDURE — 88341 IMHCHEM/IMCYTCHM EA ADD ANTB: CPT | Performed by: INTERNAL MEDICINE

## 2022-02-10 PROCEDURE — 85610 PROTHROMBIN TIME: CPT | Performed by: RADIOLOGY

## 2022-02-10 PROCEDURE — 25010000002 FENTANYL CITRATE (PF) 50 MCG/ML SOLUTION: Performed by: RADIOLOGY

## 2022-02-10 PROCEDURE — 0 LIDOCAINE 1 % SOLUTION: Performed by: RADIOLOGY

## 2022-02-10 PROCEDURE — 77012 CT SCAN FOR NEEDLE BIOPSY: CPT

## 2022-02-10 PROCEDURE — 85097 BONE MARROW INTERPRETATION: CPT | Performed by: INTERNAL MEDICINE

## 2022-02-10 RX ORDER — SODIUM CHLORIDE 0.9 % (FLUSH) 0.9 %
3 SYRINGE (ML) INJECTION EVERY 12 HOURS SCHEDULED
Status: DISCONTINUED | OUTPATIENT
Start: 2022-02-10 | End: 2022-02-11 | Stop reason: HOSPADM

## 2022-02-10 RX ORDER — MIDAZOLAM HYDROCHLORIDE 1 MG/ML
INJECTION INTRAMUSCULAR; INTRAVENOUS
Status: DISPENSED
Start: 2022-02-10 | End: 2022-02-10

## 2022-02-10 RX ORDER — LIDOCAINE HYDROCHLORIDE 10 MG/ML
10 INJECTION, SOLUTION INFILTRATION; PERINEURAL ONCE
Status: COMPLETED | OUTPATIENT
Start: 2022-02-10 | End: 2022-02-10

## 2022-02-10 RX ORDER — SODIUM CHLORIDE 0.9 % (FLUSH) 0.9 %
10 SYRINGE (ML) INJECTION AS NEEDED
Status: DISCONTINUED | OUTPATIENT
Start: 2022-02-10 | End: 2022-02-11 | Stop reason: HOSPADM

## 2022-02-10 RX ORDER — MIDAZOLAM HYDROCHLORIDE 1 MG/ML
INJECTION INTRAMUSCULAR; INTRAVENOUS
Status: COMPLETED | OUTPATIENT
Start: 2022-02-10 | End: 2022-02-10

## 2022-02-10 RX ORDER — FENTANYL CITRATE 50 UG/ML
INJECTION, SOLUTION INTRAMUSCULAR; INTRAVENOUS
Status: DISPENSED
Start: 2022-02-10 | End: 2022-02-10

## 2022-02-10 RX ORDER — FENTANYL CITRATE 50 UG/ML
INJECTION, SOLUTION INTRAMUSCULAR; INTRAVENOUS
Status: COMPLETED | OUTPATIENT
Start: 2022-02-10 | End: 2022-02-10

## 2022-02-10 RX ADMIN — MIDAZOLAM HYDROCHLORIDE 1 MG: 1 INJECTION, SOLUTION INTRAMUSCULAR; INTRAVENOUS at 10:08

## 2022-02-10 RX ADMIN — LIDOCAINE HYDROCHLORIDE 10 ML: 10 INJECTION, SOLUTION INFILTRATION; PERINEURAL at 10:20

## 2022-02-10 RX ADMIN — MIDAZOLAM HYDROCHLORIDE 1 MG: 1 INJECTION, SOLUTION INTRAMUSCULAR; INTRAVENOUS at 10:12

## 2022-02-10 RX ADMIN — FENTANYL CITRATE 50 MCG: 50 INJECTION, SOLUTION INTRAMUSCULAR; INTRAVENOUS at 10:08

## 2022-02-10 RX ADMIN — FENTANYL CITRATE 50 MCG: 50 INJECTION, SOLUTION INTRAMUSCULAR; INTRAVENOUS at 10:12

## 2022-02-10 NOTE — NURSING NOTE
CT guided bone marrow biopsy performed by Dr Velazco. Sample obtained, labeled, and taken to lab per CT tech. Patient tolerated well. 2 MG Versed and 100 MCG Fentanyl given during the procedure for a sedation time of 11 minutes. Report called to RN. JAIME [phone]

## 2022-02-10 NOTE — NURSING NOTE
Reported Critical value PLTS 45 to Dr DANITA Velazco in IR. No orders received. OK to go ahead with Bone marrow biopsy per Dr Velazco

## 2022-02-11 ENCOUNTER — TELEPHONE (OUTPATIENT)
Dept: INFUSION THERAPY | Facility: HOSPITAL | Age: 77
End: 2022-02-11

## 2022-02-18 ENCOUNTER — OFFICE VISIT (OUTPATIENT)
Dept: ONCOLOGY | Facility: CLINIC | Age: 77
End: 2022-02-18

## 2022-02-18 VITALS
RESPIRATION RATE: 16 BRPM | SYSTOLIC BLOOD PRESSURE: 131 MMHG | DIASTOLIC BLOOD PRESSURE: 82 MMHG | BODY MASS INDEX: 37.61 KG/M2 | WEIGHT: 239.6 LBS | HEART RATE: 88 BPM | HEIGHT: 67 IN | OXYGEN SATURATION: 96 % | TEMPERATURE: 97.3 F

## 2022-02-18 DIAGNOSIS — D46.9 MDS (MYELODYSPLASTIC SYNDROME): Primary | ICD-10-CM

## 2022-02-18 PROCEDURE — 99214 OFFICE O/P EST MOD 30 MIN: CPT | Performed by: INTERNAL MEDICINE

## 2022-02-18 NOTE — PROGRESS NOTES
Follow Up Office Visit      Date: 2022     Patient Name: Blanca Sparrow  MRN: [id number]  : 1945  Referring Physician: Josiah Wagner     Chief Complaint:  Follow-up for anemia and thrombocytopenia     History of Present Illness: Blanca Sparrow is a pleasant 76 y.o. female with past medical history of hypertension, GERD, hyperlipidemia, seasonal allergies, thoracic aortic aneurysm who presents today for evaluation of anemia and thrombocytopenia. The patient states that over the past few months she has been having worsening fatigue.  Denies any unexplained fevers, chills, weight loss, night sweats.  She was seen by her PCP at the beginning of January for routine health evaluation.  At that time she had labs checked which were notable for hemoglobin of 9.7 and a platelet count of 40K.  Previously she had labs in 2020 which showed hemoglobin of 12 and a platelet count of 150K.  WBC has been within normal limits.  Iron studies, folate, vitamin B12, SPEP with immunofixation were within normal limits.  She denies any family history of leukemia or lymphoma.  She denies any easy bleeding or bruising episodes.  Does note that she had a viral illness in 2021.  States that she has had multiple COVID-19 test which have all been negative.  She does note receiving the vaccine in January/2021 and her booster in 2021.  She otherwise denies any new medication changes     Interval History:  Presents clinic for follow-up.  Stable at this time.  Does note some worsening fatigue but it is overall tolerable.  Denies any easy bleeding or bruising episodes.  Denies any unexplained fevers, chills, night sweats, weight loss    Oncology History:    Oncology/Hematology History    No history exists.       Subjective      Review of Systems:   Constitutional: Negative for fevers, chills, or weight loss  Eyes: Negative for blurred vision or discharge         Ear/Nose/Throat: Negative for difficulty  swallowing, sore throat, LAD                                                       Respiratory: Negative for cough, SOA, wheezing                                                                                        Cardiovascular: Negative for chest pain or palpitations                                                                  Gastrointestinal: Negative for nausea, vomiting or diarrhea                                                                     Genitourinary: Negative for dysuria or hematuria                                                                                           Musculoskeletal: Negative for any joint pains or muscle aches                                                                        Neurologic: Negative for any weakness, headaches, dizziness                                                                         Hematologic: Negative for any easy bleeding or bruising                                                                                   Psychiatric: Negative for anxiety or depression                          Past Medical History/Past Surgical History/ Family History/ Social History: Reviewed by me and unchanged from my previous documentation done on January 2021.     Medications:     Current Outpatient Medications:   •  aspirin 81 MG EC tablet, Take 81 mg by mouth Daily., Disp: , Rfl:   •  Calcium Carbonate (Caltrate 600) 1500 (600 Ca) MG tablet, Take  by mouth Daily., Disp: , Rfl:   •  Calcium Carbonate-Vitamin D (CALCIUM + D PO), Take  by mouth daily., Disp: , Rfl:   •  celecoxib (CeleBREX) 200 MG capsule, TAKE 1 CAPSULE EVERY DAY, Disp: , Rfl: 3  •  cetirizine (zyrTEC) 10 MG tablet, Take 10 mg by mouth Daily., Disp: , Rfl:   •  Cholecalciferol (VITAMIN D-3 PO), Take 5,000 Units by mouth daily., Disp: , Rfl:   •  cyclobenzaprine (FLEXERIL) 10 MG tablet, Take 10 mg by mouth 3 (Three) Times a Day As Needed., Disp: , Rfl: 3  •  hydrocortisone butyrate (LOCOID)  "0.1 % cream, APPLY THIN LAYER TOPICALLY TO THE AFFECTED AREA TWICE DAILY, Disp: , Rfl:   •  montelukast (SINGULAIR) 10 MG tablet, Take 10 mg by mouth daily., Disp: , Rfl:   •  niacinamide 500 MG tablet, 100 mg Daily., Disp: , Rfl:   •  Olmesartan Medoxomil (BENICAR PO), Take 30 mg by mouth Daily., Disp: , Rfl:   •  omeprazole (PriLOSEC) 40 MG capsule, Take 40 mg by mouth daily., Disp: , Rfl:   •  pravastatin (PRAVACHOL) 40 MG tablet, Take 80 mg by mouth Daily., Disp: , Rfl:   •  raloxifene (EVISTA) 60 MG tablet, Take 60 mg by mouth daily., Disp: , Rfl:   •  raNITIdine (ZANTAC) 300 MG tablet, Take 300 mg by mouth Every Night., Disp: , Rfl:   •  traZODone (DESYREL) 50 MG tablet, trazodone 50 mg tablet, Disp: , Rfl:   •  venlafaxine XR (EFFEXOR-XR) 150 MG 24 hr capsule, Take 150 mg by mouth daily., Disp: , Rfl:     Allergies:   No Known Allergies    Objective     Physical Exam:  Vital Signs:   Vitals:    02/18/22 0927   BP: 131/82   Pulse: 88   Resp: 16   Temp: 97.3 °F (36.3 °C)   TempSrc: Temporal   SpO2: 96%   Weight: 109 kg (239 lb 9.6 oz)   Height: 170.2 cm (67\")   PainSc: 0-No pain     Pain Score    02/18/22 0927   PainSc: 0-No pain     ECOG Performance Status: 0 - Asymptomatic    Constitutional: NAD, ECOG 0  Eyes: PERRLA, scleral anicteric  ENT: No LAD, no thyromegaly  Respiratory: CTAB, no wheezing, rales, rhonchi  Cardiovascular: RRR, no murmurs, pulses 2+ bilaterally  Abdomen: soft, NT/ND, no HSM  Musculoskeletal: strength 5/5 bilaterally, no c/c/e  Neurologic: A&O x 3, CN II-XII intact grossly    Results Review:   Hospital Outpatient Visit on 02/10/2022   Component Date Value Ref Range Status   • Protime 02/10/2022 13.2  11.4 - 14.4 Seconds Final   • INR 02/10/2022 1.03  0.85 - 1.16 Final   • PTT 02/10/2022 28.2  22.0 - 39.0 seconds Final   • WBC 02/10/2022 4.12  3.40 - 10.80 10*3/mm3 Final   • RBC 02/10/2022 2.95* 3.77 - 5.28 10*6/mm3 Final   • Hemoglobin 02/10/2022 10.4* 12.0 - 15.9 g/dL Final   • " Hematocrit 02/10/2022 31.7* 34.0 - 46.6 % Final   • MCV 02/10/2022 107.5* 79.0 - 97.0 fL Final   • MCH 02/10/2022 35.3* 26.6 - 33.0 pg Final   • MCHC 02/10/2022 32.8  31.5 - 35.7 g/dL Final   • RDW 02/10/2022 14.0  12.3 - 15.4 % Final   • RDW-SD 02/10/2022 55.4* 37.0 - 54.0 fl Final   • MPV 02/10/2022 10.9  6.0 - 12.0 fL Final   • Platelets 02/10/2022 45* 140 - 450 10*3/mm3 Final   • Neutrophil % 02/10/2022 46.6  42.7 - 76.0 % Final   • Lymphocyte % 02/10/2022 42.2  19.6 - 45.3 % Final   • Monocyte % 02/10/2022 9.0  5.0 - 12.0 % Final   • Eosinophil % 02/10/2022 1.7  0.3 - 6.2 % Final   • Basophil % 02/10/2022 0.0  0.0 - 1.5 % Final   • Immature Grans % 02/10/2022 0.5  0.0 - 0.5 % Final   • Neutrophils, Absolute 02/10/2022 1.92  1.70 - 7.00 10*3/mm3 Final   • Lymphocytes, Absolute 02/10/2022 1.74  0.70 - 3.10 10*3/mm3 Final   • Monocytes, Absolute 02/10/2022 0.37  0.10 - 0.90 10*3/mm3 Final   • Eosinophils, Absolute 02/10/2022 0.07  0.00 - 0.40 10*3/mm3 Final   • Basophils, Absolute 02/10/2022 0.00  0.00 - 0.20 10*3/mm3 Final   • Immature Grans, Absolute 02/10/2022 0.02  0.00 - 0.05 10*3/mm3 Final   • nRBC 02/10/2022 0.0  0.0 - 0.2 /100 WBC Final   • RBC Morphology 02/10/2022 Normal  Normal Final   • WBC Morphology 02/10/2022 Normal  Normal Final   • Platelet Estimate 02/10/2022 Decreased  Normal Final   • Case Report 02/10/2022    Incomplete                    Value:Surgical Pathology Report                         Case: [id number]                                  Authorizing Provider:  Braydon Espinal MD      Collected:           02/10/2022 10:16 AM          Ordering Location:     [facility]   Received:            02/10/2022 10:31 AM                                 CT                                                                           Pathologist:           John Burgess MD                                                          Specimens:   1) - Iliac Crest, Right - Aspirate                                                                   2) - Iliac Crest, Right - Biopsy                                                          • Clinical Information 02/10/2022    Incomplete                    Value:This result contains rich text formatting which cannot be displayed here.   • Final Diagnosis 02/10/2022    Incomplete                    Value:This result contains rich text formatting which cannot be displayed here.   • Comment 02/10/2022    Incomplete                    Value:This result contains rich text formatting which cannot be displayed here.   • Gross Description 02/10/2022    Incomplete                    Value:This result contains rich text formatting which cannot be displayed here.   • Flow Cytometry Summary 02/10/2022    Incomplete                    Value:This result contains rich text formatting which cannot be displayed here.   • CBC and Differential 02/10/2022    Incomplete                    Value:This result contains rich text formatting which cannot be displayed here.   • Aspirate Smear 02/10/2022    Incomplete                    Value:This result contains rich text formatting which cannot be displayed here.   • Core Biopsy  02/10/2022    Incomplete                    Value:This result contains rich text formatting which cannot be displayed here.   • Clot Section 02/10/2022    Incomplete                    Value:This result contains rich text formatting which cannot be displayed here.       US Abdomen Complete    Result Date: 1/21/2022  Narrative: EXAMINATION: US ABDOMEN COMPLETE- 01/21/2022  INDICATION: thrombocytopenia, rule out liver/spleen disease; D69.6-Thrombocytopenia, unspecified  TECHNIQUE: Ultrasound abdomen complete  COMPARISON: NONE  FINDINGS:  Visualized portions of the aorta and IVC unremarkable.  Visualized portions of the pancreas within normal limits.  Liver demonstrates homogeneous liver parenchyma without focal liver lesion.  Gallbladder is present without  cholelithiasis, gallbladder wall thickening or pericholecystic fluid.  Common bile duct measures 7 mm in diameter within normal limits.  Right kidney 13.4 cm in length without evidence of hydronephrosis, contour deforming mass or obvious calculi.  Left kidney measures 12.6 cm in length containing a single 4.9 cm renal cortical cyst.  Spleen measures 10.1 cm in length.      Impression: Normal ultrasound abdomen.  D:  01/21/2022 E:  01/21/2022    This report was finalized on 1/21/2022 11:53 AM by Dr. Amadou Rodgers.      CT Guided Biopsy Bone Marrow    Result Date: 2/10/2022  Narrative: DATE OF EXAM: 2/10/2022 9:51 AM  PROCEDURE: CT GUIDED BIOPSY BONE MARROW-  INDICATIONS: thrombocytopenia; D69.6-Thrombocytopenia, unspecified  COMPARISON: No Comparisons Available  FLUOROSCOPIC TIME: Dose length product of 324 mGycm  PHYSICIAN MONITORED CONSCIOUS SEDATION TIME: 11 minutes  TECHNIQUE: The interventional radiology nurse monitored the patient at all times. The patient was placed prone on the CT fluoroscopy table and the left posterior superior iliac spine was marked and prepped and draped in the usual sterile fashion. The skin was anesthetized with 1% lidocaine. Next, under CT fluoroscopic guidance an 11-gauge Corban Direct bone biopsy needle was advanced just deep to the bony cortex. 1 mL of bone marrow blood was aspirated and given to the cytopathology tech, who noted the presence of spicules. Next, a total of 7 mL of bone marrow aspirate was obtained and given to the tech. Finally, a biopsy was obtained using the 11-gauge needle. The needle was then removed. The patient tolerated the procedure well without immediate complication.  FINDINGS: See above.      Impression: Successful CT-guided bone marrow biopsy.  This report was finalized on 2/10/2022 10:47 AM by Booker Velazco MD.        Assessment / Plan      Assessment/Plan:   1.  Low-grade myelodysplastic syndrome  -Hemoglobin 9.7 in January 2022.  Previously 12 December  2020  -Review of outside records notable for normal iron studies, jordin B12, folate, SPEP  -LDH, haptoglobin, SAM within normal limits  -Copper, zinc levels within normal limits  -Peripheral smear without evidence of schistocytes  -Reticulocyte count mildly elevated  -Bone marrow biopsy consistent with a low-grade MDS.  Cytogenetics and FISH panel pending  -Discussed the results with patient today.  Discussed that this is a precursor to a potential acute leukemia  -Discussed that treatments include observation, transfusions, potential EPO, chemotherapy based medications  -Given her low-grade, low risk disease and not significant cytopenias, we can proceed with active surveillance  -Plan to repeat a CBC in 2 months        Follow Up:   Follow-up in 2 months with repeat CBC     Braydon Espinal MD  Hematology and Oncology     Please note that portions of this note may have been completed with a voice recognition program. Efforts were made to edit the dictations, but occasionally words are mistranscribed.

## 2022-04-20 ENCOUNTER — LAB (OUTPATIENT)
Dept: LAB | Facility: HOSPITAL | Age: 77
End: 2022-04-20

## 2022-04-20 ENCOUNTER — OFFICE VISIT (OUTPATIENT)
Dept: ONCOLOGY | Facility: CLINIC | Age: 77
End: 2022-04-20

## 2022-04-20 VITALS
HEIGHT: 67 IN | RESPIRATION RATE: 16 BRPM | SYSTOLIC BLOOD PRESSURE: 158 MMHG | DIASTOLIC BLOOD PRESSURE: 72 MMHG | HEART RATE: 93 BPM | BODY MASS INDEX: 37.98 KG/M2 | WEIGHT: 242 LBS | OXYGEN SATURATION: 97 % | TEMPERATURE: 97.1 F

## 2022-04-20 DIAGNOSIS — D46.9 MDS (MYELODYSPLASTIC SYNDROME): ICD-10-CM

## 2022-04-20 DIAGNOSIS — D46.9 MDS (MYELODYSPLASTIC SYNDROME): Primary | ICD-10-CM

## 2022-04-20 LAB
ERYTHROCYTE [DISTWIDTH] IN BLOOD BY AUTOMATED COUNT: 15.4 % (ref 12.3–15.4)
HCT VFR BLD AUTO: 35.1 % (ref 34–46.6)
HGB BLD-MCNC: 11 G/DL (ref 12–15.9)
LYMPHOCYTES # BLD AUTO: 2 10*3/MM3 (ref 0.7–3.1)
LYMPHOCYTES NFR BLD AUTO: 46.4 % (ref 19.6–45.3)
MCH RBC QN AUTO: 32.4 PG (ref 26.6–33)
MCHC RBC AUTO-ENTMCNC: 31.3 G/DL (ref 31.5–35.7)
MCV RBC AUTO: 103.5 FL (ref 79–97)
MONOCYTES # BLD AUTO: 0.3 10*3/MM3 (ref 0.1–0.9)
MONOCYTES NFR BLD AUTO: 6.2 % (ref 5–12)
NEUTROPHILS NFR BLD AUTO: 2 10*3/MM3 (ref 1.7–7)
NEUTROPHILS NFR BLD AUTO: 47.4 % (ref 42.7–76)
PLATELET # BLD AUTO: 46 10*3/MM3 (ref 140–450)
PMV BLD AUTO: 6.4 FL (ref 6–12)
RBC # BLD AUTO: 3.39 10*6/MM3 (ref 3.77–5.28)
WBC NRBC COR # BLD: 4.3 10*3/MM3 (ref 3.4–10.8)

## 2022-04-20 PROCEDURE — 85025 COMPLETE CBC W/AUTO DIFF WBC: CPT

## 2022-04-20 PROCEDURE — 99214 OFFICE O/P EST MOD 30 MIN: CPT | Performed by: INTERNAL MEDICINE

## 2022-04-20 PROCEDURE — 36415 COLL VENOUS BLD VENIPUNCTURE: CPT

## 2022-04-20 RX ORDER — PHENTERMINE HYDROCHLORIDE 15 MG/1
15 CAPSULE ORAL EVERY MORNING
COMMUNITY
Start: 2022-03-16 | End: 2023-01-19

## 2022-04-20 RX ORDER — HYDROCODONE BITARTRATE AND ACETAMINOPHEN 5; 325 MG/1; MG/1
TABLET ORAL
COMMUNITY
Start: 2022-03-10 | End: 2023-01-19

## 2022-04-20 RX ORDER — IBUPROFEN 600 MG/1
TABLET ORAL
COMMUNITY
Start: 2022-03-11 | End: 2023-01-19

## 2022-04-20 RX ORDER — TOPIRAMATE 25 MG/1
TABLET ORAL
COMMUNITY
End: 2023-01-19

## 2022-04-20 RX ORDER — CHLORHEXIDINE GLUCONATE 0.12 MG/ML
RINSE ORAL
COMMUNITY
Start: 2022-03-10 | End: 2023-01-19

## 2022-04-20 NOTE — PROGRESS NOTES
Follow Up Office Visit      Date: 2022     Patient Name: Blanca Sparrow  MRN: [id number]  : 1945  Referring Physician: Josiah Wagner     Chief Complaint:  Follow-up for anemia and thrombocytopenia     History of Present Illness: Blanca Sparrow is a pleasant 76 y.o. female with past medical history of hypertension, GERD, hyperlipidemia, seasonal allergies, thoracic aortic aneurysm who presents today for evaluation of anemia and thrombocytopenia. The patient states that over the past few months she has been having worsening fatigue.  Denies any unexplained fevers, chills, weight loss, night sweats.  She was seen by her PCP at the beginning of January for routine health evaluation.  At that time she had labs checked which were notable for hemoglobin of 9.7 and a platelet count of 40K.  Previously she had labs in 2020 which showed hemoglobin of 12 and a platelet count of 150K.  WBC has been within normal limits.  Iron studies, folate, vitamin B12, SPEP with immunofixation were within normal limits.  She denies any family history of leukemia or lymphoma.  She denies any easy bleeding or bruising episodes.  Does note that she had a viral illness in 2021.  States that she has had multiple COVID-19 test which have all been negative.  She does note receiving the vaccine in January/2021 and her booster in 2021.  She otherwise denies any new medication changes     Interval History:  Presents clinic for follow-up.  Overall doing well at this time.  Does note some easy bruising but denies any significant bleeding episodes.  Denies any unexplained fevers, chills, night sweats, weight loss    Oncology History:    Oncology/Hematology History    No history exists.       Subjective      Review of Systems:   Constitutional: Negative for fevers, chills, or weight loss  Eyes: Negative for blurred vision or discharge         Ear/Nose/Throat: Negative for difficulty swallowing, sore  throat, LAD                                                       Respiratory: Negative for cough, SOA, wheezing                                                                                        Cardiovascular: Negative for chest pain or palpitations                                                                  Gastrointestinal: Negative for nausea, vomiting or diarrhea                                                                     Genitourinary: Negative for dysuria or hematuria                                                                                           Musculoskeletal: Negative for any joint pains or muscle aches                                                                        Neurologic: Negative for any weakness, headaches, dizziness                                                                         Hematologic: Negative for any easy bleeding or bruising                                                                                   Psychiatric: Negative for anxiety or depression                          Past Medical History/Past Surgical History/ Family History/ Social History: Reviewed by me and unchanged from my previous documentation done on February 2022.     Medications:     Current Outpatient Medications:   •  HYDROcodone-acetaminophen (NORCO) 5-325 MG per tablet, hydrocodone 5 mg-acetaminophen 325 mg tablet  TAKE 1 TABLET BY MOUTH EVERY 6 HOURS FOR UP TO 3 DAYS AS NEEDED FOR SEVERE PAIN, Disp: , Rfl:   •  aspirin 81 MG EC tablet, Take 81 mg by mouth Daily., Disp: , Rfl:   •  Calcium Carbonate (Caltrate 600) 1500 (600 Ca) MG tablet, Take  by mouth Daily., Disp: , Rfl:   •  Calcium Carbonate-Vitamin D (CALCIUM + D PO), Take  by mouth daily., Disp: , Rfl:   •  celecoxib (CeleBREX) 200 MG capsule, TAKE 1 CAPSULE EVERY DAY, Disp: , Rfl: 3  •  cetirizine (zyrTEC) 10 MG tablet, Take 10 mg by mouth Daily., Disp: , Rfl:   •  chlorhexidine (PERIDEX) 0.12 % solution, USE 15  "MLS BY MOUTH OR THROAT AS NEEDED FOR WOUND CARE FOR UP TO 14 DAYS AS DIRECTED, Disp: , Rfl:   •  Cholecalciferol (VITAMIN D-3 PO), Take 5,000 Units by mouth daily., Disp: , Rfl:   •  cyclobenzaprine (FLEXERIL) 10 MG tablet, Take 10 mg by mouth 3 (Three) Times a Day As Needed., Disp: , Rfl: 3  •  hydrocortisone butyrate (LOCOID) 0.1 % cream, APPLY THIN LAYER TOPICALLY TO THE AFFECTED AREA TWICE DAILY, Disp: , Rfl:   •  ibuprofen (ADVIL,MOTRIN) 600 MG tablet, , Disp: , Rfl:   •  montelukast (SINGULAIR) 10 MG tablet, Take 10 mg by mouth daily., Disp: , Rfl:   •  niacinamide 500 MG tablet, 100 mg Daily., Disp: , Rfl:   •  Olmesartan Medoxomil (BENICAR PO), Take 30 mg by mouth Daily., Disp: , Rfl:   •  omeprazole (PriLOSEC) 40 MG capsule, Take 40 mg by mouth daily., Disp: , Rfl:   •  phentermine 15 MG capsule, Take 15 mg by mouth Every Morning., Disp: , Rfl:   •  pravastatin (PRAVACHOL) 40 MG tablet, Take 80 mg by mouth Daily., Disp: , Rfl:   •  raloxifene (EVISTA) 60 MG tablet, Take 60 mg by mouth daily., Disp: , Rfl:   •  raNITIdine (ZANTAC) 300 MG tablet, Take 300 mg by mouth Every Night., Disp: , Rfl:   •  topiramate (TOPAMAX) 25 MG tablet, topiramate 25 mg tablet  Take 1 tablet every day by oral route., Disp: , Rfl:   •  traZODone (DESYREL) 50 MG tablet, trazodone 50 mg tablet, Disp: , Rfl:   •  venlafaxine XR (EFFEXOR-XR) 150 MG 24 hr capsule, Take 150 mg by mouth daily., Disp: , Rfl:     Allergies:   No Known Allergies    Objective     Physical Exam:  Vital Signs:   Vitals:    04/20/22 1035   BP: 158/72   Pulse: 93   Resp: 16   Temp: 97.1 °F (36.2 °C)   TempSrc: Temporal   SpO2: 97%   Weight: 110 kg (242 lb)   Height: 170.2 cm (67.01\")   PainSc: 0-No pain     Pain Score    04/20/22 1035   PainSc: 0-No pain     ECOG Performance Status: 0 - Asymptomatic    Constitutional: NAD, ECOG 0  Eyes: PERRLA, scleral anicteric  ENT: No LAD, no thyromegaly  Respiratory: CTAB, no wheezing, rales, rhonchi  Cardiovascular: RRR, no " murmurs, pulses 2+ bilaterally  Abdomen: soft, NT/ND, no HSM  Musculoskeletal: strength 5/5 bilaterally, no c/c/e  Neurologic: A&O x 3, CN II-XII intact grossly    Results Review:   Lab on 04/20/2022   Component Date Value Ref Range Status   • WBC 04/20/2022 4.30  3.40 - 10.80 10*3/mm3 Final   • RBC 04/20/2022 3.39 (A) 3.77 - 5.28 10*6/mm3 Final   • Hemoglobin 04/20/2022 11.0 (A) 12.0 - 15.9 g/dL Final   • Hematocrit 04/20/2022 35.1  34.0 - 46.6 % Final   • RDW 04/20/2022 15.4  12.3 - 15.4 % Final   • MCV 04/20/2022 103.5 (A) 79.0 - 97.0 fL Final   • MCH 04/20/2022 32.4  26.6 - 33.0 pg Final   • MCHC 04/20/2022 31.3 (A) 31.5 - 35.7 g/dL Final   • MPV 04/20/2022 6.4  6.0 - 12.0 fL Final   • Platelets 04/20/2022 46 (A) 140 - 450 10*3/mm3 Final   • Neutrophil % 04/20/2022 47.4  42.7 - 76.0 % Final   • Lymphocyte % 04/20/2022 46.4 (A) 19.6 - 45.3 % Final   • Monocyte % 04/20/2022 6.2  5.0 - 12.0 % Final   • Neutrophils, Absolute 04/20/2022 2.00  1.70 - 7.00 10*3/mm3 Final   • Lymphocytes, Absolute 04/20/2022 2.00  0.70 - 3.10 10*3/mm3 Final   • Monocytes, Absolute 04/20/2022 0.30  0.10 - 0.90 10*3/mm3 Final       No results found.    Assessment / Plan      Assessment/Plan:   1.  Low-grade myelodysplastic syndrome  -Hemoglobin 9.7 in January 2022.  Previously 12 December 2020  -Review of outside records notable for normal iron studies, jordin B12, folate, SPEP  -LDH, haptoglobin, SAM within normal limits  -Copper, zinc levels within normal limits  -Peripheral smear without evidence of schistocytes  -Reticulocyte count mildly elevated  -Bone marrow biopsy consistent with a low-grade MDS.  Cytogenetics and FISH panel pending  -Given her low-grade, low risk disease and not significant cytopenias, we can proceeded with active surveillance  -Repeat CBC in April 2022 with no worsening of her anemia or thrombocytopenia  -We will continue with active surveillance at this time  -Plan to repeat a CBC in 3 months         Follow Up:    Follow-up in 3 months     Braydon Espinal MD  Hematology and Oncology     Please note that portions of this note may have been completed with a voice recognition program. Efforts were made to edit the dictations, but occasionally words are mistranscribed.

## 2022-07-01 DIAGNOSIS — Z91.89 AT HIGH RISK FOR BREAST CANCER: Primary | ICD-10-CM

## 2022-07-12 ENCOUNTER — OFFICE VISIT (OUTPATIENT)
Dept: ONCOLOGY | Facility: CLINIC | Age: 77
End: 2022-07-12

## 2022-07-12 ENCOUNTER — LAB (OUTPATIENT)
Dept: LAB | Facility: HOSPITAL | Age: 77
End: 2022-07-12

## 2022-07-12 VITALS
HEART RATE: 83 BPM | SYSTOLIC BLOOD PRESSURE: 119 MMHG | DIASTOLIC BLOOD PRESSURE: 66 MMHG | WEIGHT: 242.7 LBS | TEMPERATURE: 97.1 F | OXYGEN SATURATION: 95 % | BODY MASS INDEX: 38 KG/M2

## 2022-07-12 DIAGNOSIS — D46.9 MDS (MYELODYSPLASTIC SYNDROME): Primary | ICD-10-CM

## 2022-07-12 DIAGNOSIS — D64.9 ANEMIA, UNSPECIFIED TYPE: Primary | ICD-10-CM

## 2022-07-12 DIAGNOSIS — D46.9 MDS (MYELODYSPLASTIC SYNDROME): ICD-10-CM

## 2022-07-12 LAB
ERYTHROCYTE [DISTWIDTH] IN BLOOD BY AUTOMATED COUNT: 16.2 % (ref 12.3–15.4)
HCT VFR BLD AUTO: 30.6 % (ref 34–46.6)
HGB BLD-MCNC: 9.8 G/DL (ref 12–15.9)
LYMPHOCYTES # BLD AUTO: 1.6 10*3/MM3 (ref 0.7–3.1)
LYMPHOCYTES NFR BLD AUTO: 39.6 % (ref 19.6–45.3)
MCH RBC QN AUTO: 33.9 PG (ref 26.6–33)
MCHC RBC AUTO-ENTMCNC: 32.2 G/DL (ref 31.5–35.7)
MCV RBC AUTO: 105.5 FL (ref 79–97)
MONOCYTES # BLD AUTO: 0.3 10*3/MM3 (ref 0.1–0.9)
MONOCYTES NFR BLD AUTO: 7.3 % (ref 5–12)
NEUTROPHILS NFR BLD AUTO: 2.1 10*3/MM3 (ref 1.7–7)
NEUTROPHILS NFR BLD AUTO: 53.1 % (ref 42.7–76)
PLATELET # BLD AUTO: 34 10*3/MM3 (ref 140–450)
PMV BLD AUTO: 6.5 FL (ref 6–12)
RBC # BLD AUTO: 2.9 10*6/MM3 (ref 3.77–5.28)
WBC NRBC COR # BLD: 4 10*3/MM3 (ref 3.4–10.8)

## 2022-07-12 PROCEDURE — 99214 OFFICE O/P EST MOD 30 MIN: CPT | Performed by: INTERNAL MEDICINE

## 2022-07-12 PROCEDURE — 36415 COLL VENOUS BLD VENIPUNCTURE: CPT

## 2022-07-12 PROCEDURE — 85025 COMPLETE CBC W/AUTO DIFF WBC: CPT

## 2022-07-12 NOTE — PROGRESS NOTES
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 female with past medical history of hypertension, GERD, hyperlipidemia, seasonal allergies, thoracic aortic aneurysm who presents today for evaluation of anemia and thrombocytopenia. The patient states that over the past few months she has been having worsening fatigue.  Denies any unexplained fevers, chills, weight loss, night sweats.  She was seen by her PCP at the beginning of January for routine health evaluation.  At that time she had labs checked which were notable for hemoglobin of 9.7 and a platelet count of 40K.  Previously she had labs in 2020 which showed hemoglobin of 12 and a platelet count of 150K.  WBC has been within normal limits.  Iron studies, folate, vitamin B12, SPEP with immunofixation were within normal limits.  She denies any family history of leukemia or lymphoma.  She denies any easy bleeding or bruising episodes.  Does note that she had a viral illness in 2021.  States that she has had multiple COVID-19 test which have all been negative.  She does note receiving the vaccine in 2021 and her booster in 2021.  She otherwise denies any new medication changes     Interval History:  Presents clinic for follow-up.  Overall stable at this time.  Has noted some increased bruising and fatigue but denies any bleeding episodes.  Denies any unexplained fevers, chills, night sweats, weight loss    Oncology History:    Oncology/Hematology History    No history exists.       Subjective      Review of Systems:   Constitutional: Negative for fevers, chills, or weight loss  Eyes: Negative for blurred vision or discharge         Ear/Nose/Throat: Negative for difficulty  swallowing, sore throat, LAD                                                       Respiratory: Negative for cough, SOA, wheezing                                                                                        Cardiovascular: Negative for chest pain or palpitations                                                                  Gastrointestinal: Negative for nausea, vomiting or diarrhea                                                                     Genitourinary: Negative for dysuria or hematuria                                                                                           Musculoskeletal: Negative for any joint pains or muscle aches                                                                        Neurologic: Negative for any weakness, headaches, dizziness                                                                         Hematologic: Negative for any easy bleeding or bruising                                                                                   Psychiatric: Negative for anxiety or depression                          Past Medical History/Past Surgical History/ Family History/ Social History: Reviewed by me and unchanged from my previous documentation done on April 2022.     Medications:     Current Outpatient Medications:   •  celecoxib (CeleBREX) 200 MG capsule, TAKE 1 CAPSULE EVERY DAY, Disp: , Rfl: 3  •  cetirizine (zyrTEC) 10 MG tablet, Take 10 mg by mouth Daily., Disp: , Rfl:   •  chlorhexidine (PERIDEX) 0.12 % solution, USE 15 MLS BY MOUTH OR THROAT AS NEEDED FOR WOUND CARE FOR UP TO 14 DAYS AS DIRECTED, Disp: , Rfl:   •  Cholecalciferol (VITAMIN D-3 PO), Take 5,000 Units by mouth daily., Disp: , Rfl:   •  cyclobenzaprine (FLEXERIL) 10 MG tablet, Take 10 mg by mouth 3 (Three) Times a Day As Needed., Disp: , Rfl: 3  •  montelukast (SINGULAIR) 10 MG tablet, Take 10 mg by mouth daily., Disp: , Rfl:   •  niacinamide 500 MG tablet, 100 mg Daily., Disp: , Rfl:   •   Olmesartan Medoxomil (BENICAR PO), Take 30 mg by mouth Daily., Disp: , Rfl:   •  omeprazole (PriLOSEC) 40 MG capsule, Take 40 mg by mouth daily., Disp: , Rfl:   •  phentermine 15 MG capsule, Take 15 mg by mouth Every Morning., Disp: , Rfl:   •  pravastatin (PRAVACHOL) 40 MG tablet, Take 80 mg by mouth Daily., Disp: , Rfl:   •  raNITIdine (ZANTAC) 300 MG tablet, Take 300 mg by mouth Every Night., Disp: , Rfl:   •  topiramate (TOPAMAX) 25 MG tablet, topiramate 25 mg tablet  Take 1 tablet every day by oral route., Disp: , Rfl:   •  venlafaxine XR (EFFEXOR-XR) 150 MG 24 hr capsule, Take 150 mg by mouth daily., Disp: , Rfl:   •  aspirin 81 MG EC tablet, Take 81 mg by mouth Daily., Disp: , Rfl:   •  Calcium Carbonate 1500 (600 Ca) MG tablet, Take  by mouth Daily., Disp: , Rfl:   •  Calcium Carbonate-Vitamin D (CALCIUM + D PO), Take  by mouth daily., Disp: , Rfl:   •  HYDROcodone-acetaminophen (NORCO) 5-325 MG per tablet, hydrocodone 5 mg-acetaminophen 325 mg tablet  TAKE 1 TABLET BY MOUTH EVERY 6 HOURS FOR UP TO 3 DAYS AS NEEDED FOR SEVERE PAIN, Disp: , Rfl:   •  hydrocortisone butyrate (LOCOID) 0.1 % cream, APPLY THIN LAYER TOPICALLY TO THE AFFECTED AREA TWICE DAILY, Disp: , Rfl:   •  ibuprofen (ADVIL,MOTRIN) 600 MG tablet, , Disp: , Rfl:   •  raloxifene (EVISTA) 60 MG tablet, Take 60 mg by mouth daily., Disp: , Rfl:   •  traZODone (DESYREL) 50 MG tablet, trazodone 50 mg tablet, Disp: , Rfl:     Allergies:   No Known Allergies    Objective     Physical Exam:  Vital Signs:   Vitals:    07/12/22 1102   BP: 119/66   Pulse: 83   Temp: 97.1 °F (36.2 °C)   SpO2: 95%   Weight: 110 kg (242 lb 11.2 oz)     There were no vitals filed for this visit.  ECOG Performance Status: 0 - Asymptomatic    Constitutional: NAD, ECOG 0  Eyes: PERRLA, scleral anicteric  ENT: No LAD, no thyromegaly  Respiratory: CTAB, no wheezing, rales, rhonchi  Cardiovascular: RRR, no murmurs, pulses 2+ bilaterally  Abdomen: soft, NT/ND, no  HSM  Musculoskeletal: strength 5/5 bilaterally, no c/c/e  Neurologic: A&O x 3, CN II-XII intact grossly    Results Review:   Lab on 07/12/2022   Component Date Value Ref Range Status   • WBC 07/12/2022 4.00  3.40 - 10.80 10*3/mm3 Final   • RBC 07/12/2022 2.90 (A) 3.77 - 5.28 10*6/mm3 Final   • Hemoglobin 07/12/2022 9.8 (A) 12.0 - 15.9 g/dL Final   • Hematocrit 07/12/2022 30.6 (A) 34.0 - 46.6 % Final   • RDW 07/12/2022 16.2 (A) 12.3 - 15.4 % Final   • MCV 07/12/2022 105.5 (A) 79.0 - 97.0 fL Final   • MCH 07/12/2022 33.9 (A) 26.6 - 33.0 pg Final   • MCHC 07/12/2022 32.2  31.5 - 35.7 g/dL Final   • MPV 07/12/2022 6.5  6.0 - 12.0 fL Final   • Platelets 07/12/2022 34 (A) 140 - 450 10*3/mm3 Final    Verified by repeat analysis.    • Neutrophil % 07/12/2022 53.1  42.7 - 76.0 % Final   • Lymphocyte % 07/12/2022 39.6  19.6 - 45.3 % Final   • Monocyte % 07/12/2022 7.3  5.0 - 12.0 % Final   • Neutrophils, Absolute 07/12/2022 2.10  1.70 - 7.00 10*3/mm3 Final   • Lymphocytes, Absolute 07/12/2022 1.60  0.70 - 3.10 10*3/mm3 Final   • Monocytes, Absolute 07/12/2022 0.30  0.10 - 0.90 10*3/mm3 Final       No results found.    Assessment / Plan      Assessment/Plan:   1.  Low-grade myelodysplastic syndrome  -Hemoglobin 9.7 in January 2022.  Previously 12 December 2020  -Review of outside records notable for normal iron studies, jordin B12, folate, SPEP  -LDH, haptoglobin, SAM within normal limits  -Copper, zinc levels within normal limits  -Peripheral smear without evidence of schistocytes  -Reticulocyte count mildly elevated  -Bone marrow biopsy consistent with a low-grade MDS.  FISH panel negative and cytogenetics within normal limits  -Repeat platelet count in July 2022 34K which is lower than around 45K 3 months ago  -Hemoglobin slightly decreased but WBC stable  -Discussed continued surveillance vs possible transition to hyper methylating agent.  At this time we will continue with active surveillance  -We will plan to repeat  platelet count in 1 month and consider transfusion at that time  -Patient planning on moving to [place] at the end of August.  Will assist with referral to hematologist who could consider treatment at that time         Follow Up:   Follow-up in 1 month with repeat CBC     Braydon Espinal MD  Hematology and Oncology     Please note that portions of this note may have been completed with a voice recognition program. Efforts were made to edit the dictations, but occasionally words are mistranscribed.

## 2022-07-20 ENCOUNTER — TELEPHONE (OUTPATIENT)
Dept: ONCOLOGY | Facility: CLINIC | Age: 77
End: 2022-07-20

## 2022-07-20 NOTE — TELEPHONE ENCOUNTER
Caller: Sebastian Sparrow    Relationship: Self    Best call back number: [phone]    What was the call regarding: SEBASTIAN CALLED REGARDING THE MEDICATIONS THAT DR. VALERA MENTIONED FOR HER PLATELETS. SHE IS WANTING TO KNOW WHAT THOSE MEDICATIONS ARE SO SHE CAN RESEARCH THEM.    Do you require a callback: YES, IF NO ANSWER, PLEASE LEAVE A VM AND SPELL OUT MEDICATIONS.

## 2022-07-21 ENCOUNTER — TELEPHONE (OUTPATIENT)
Dept: CARDIAC SURGERY | Facility: CLINIC | Age: 77
End: 2022-07-21

## 2022-07-21 NOTE — TELEPHONE ENCOUNTER
Call back to Blanca to provide her with a couple medications per Dr Espinal which he would consider for treatment.  Blanca is aware that her new hematologist would be making decisions on how aggressively to treat and which medications would be prescribed.  Encouraged Blanca to notify our office once she is able to find a new hematologist in the [facility] area so we may transfer her records.  Two medications verbally discussed were Azacitidine and Decitabine.

## 2022-07-21 NOTE — TELEPHONE ENCOUNTER
Caller: Blanca Sparrow    Relationship to patient: Self    Best call back number: [phone]    Chief complaint: PT CALLED IN REQUESTING TO MOVE UP HER 1 YEAR FOLLOWUP TO AUGUST AS SHE WILL BE MOVING OUT OF [facility] TO [place].     Type of visit: FOLLOW UP    Requested date: AUG 2022    If rescheduling, when is the original appointment: 10.20.22    Additional notes: RESCHEDULE IS OUTSIDE OF HUB GUIDELINES- PLEASE CALL BACK PT TO FOLLOW UP & ADVISE IF HER FOLLOW-UP CAN BE RESCHEDULED SOONER THAN 1 YEAR.

## 2022-07-27 ENCOUNTER — LAB (OUTPATIENT)
Dept: LAB | Facility: HOSPITAL | Age: 77
End: 2022-07-27

## 2022-07-27 DIAGNOSIS — D46.9 MDS (MYELODYSPLASTIC SYNDROME): ICD-10-CM

## 2022-07-27 DIAGNOSIS — D64.9 ANEMIA, UNSPECIFIED TYPE: ICD-10-CM

## 2022-07-27 LAB
BASOPHILS # BLD AUTO: 0 10*3/MM3 (ref 0–0.2)
BASOPHILS NFR BLD AUTO: 0 % (ref 0–1.5)
DEPRECATED RDW RBC AUTO: 56.8 FL (ref 37–54)
EOSINOPHIL # BLD AUTO: 0.06 10*3/MM3 (ref 0–0.4)
EOSINOPHIL NFR BLD AUTO: 1.4 % (ref 0.3–6.2)
ERYTHROCYTE [DISTWIDTH] IN BLOOD BY AUTOMATED COUNT: 14.5 % (ref 12.3–15.4)
FERRITIN SERPL-MCNC: 59.83 NG/ML (ref 13–150)
HCT VFR BLD AUTO: 29 % (ref 34–46.6)
HGB BLD-MCNC: 9.6 G/DL (ref 12–15.9)
IMM GRANULOCYTES # BLD AUTO: 0.01 10*3/MM3 (ref 0–0.05)
IMM GRANULOCYTES NFR BLD AUTO: 0.2 % (ref 0–0.5)
IRON 24H UR-MRATE: 86 MCG/DL (ref 37–145)
IRON SATN MFR SERPL: 20 % (ref 20–50)
LYMPHOCYTES # BLD AUTO: 2.21 10*3/MM3 (ref 0.7–3.1)
LYMPHOCYTES NFR BLD AUTO: 51.8 % (ref 19.6–45.3)
MCH RBC QN AUTO: 35.8 PG (ref 26.6–33)
MCHC RBC AUTO-ENTMCNC: 33.1 G/DL (ref 31.5–35.7)
MCV RBC AUTO: 108.2 FL (ref 79–97)
MONOCYTES # BLD AUTO: 0.35 10*3/MM3 (ref 0.1–0.9)
MONOCYTES NFR BLD AUTO: 8.2 % (ref 5–12)
NEUTROPHILS NFR BLD AUTO: 1.64 10*3/MM3 (ref 1.7–7)
NEUTROPHILS NFR BLD AUTO: 38.4 % (ref 42.7–76)
NRBC BLD AUTO-RTO: 0 /100 WBC (ref 0–0.2)
PLATELET # BLD AUTO: 33 10*3/MM3 (ref 140–450)
PMV BLD AUTO: 10.5 FL (ref 6–12)
RBC # BLD AUTO: 2.68 10*6/MM3 (ref 3.77–5.28)
TIBC SERPL-MCNC: 431 MCG/DL (ref 298–536)
TRANSFERRIN SERPL-MCNC: 289 MG/DL (ref 200–360)
WBC NRBC COR # BLD: 4.27 10*3/MM3 (ref 3.4–10.8)

## 2022-07-27 PROCEDURE — 84466 ASSAY OF TRANSFERRIN: CPT

## 2022-07-27 PROCEDURE — 36415 COLL VENOUS BLD VENIPUNCTURE: CPT

## 2022-07-27 PROCEDURE — 85025 COMPLETE CBC W/AUTO DIFF WBC: CPT

## 2022-07-27 PROCEDURE — 83540 ASSAY OF IRON: CPT

## 2022-07-27 PROCEDURE — 82728 ASSAY OF FERRITIN: CPT

## 2022-08-09 ENCOUNTER — OFFICE VISIT (OUTPATIENT)
Dept: ONCOLOGY | Facility: CLINIC | Age: 77
End: 2022-08-09

## 2022-08-09 DIAGNOSIS — D46.9 MDS (MYELODYSPLASTIC SYNDROME): Primary | ICD-10-CM

## 2022-08-09 PROCEDURE — 99213 OFFICE O/P EST LOW 20 MIN: CPT | Performed by: INTERNAL MEDICINE

## 2022-08-09 NOTE — PROGRESS NOTES
TELEMEDICINE FOLLOW-UP NOTE    08/09/2022    Problem List Items Addressed This Visit        Other    MDS (myelodysplastic syndrome) (HCC) - Primary        You have chosen to receive care through a telephone visit. Do you consent to use a telephone visit for your medical care today? Yes    Time Devoted to Visit: 20 min including time to review his previous imaging and records, with 50% devoted to direct discussion.    Reason for Visit:  I personally contacted Blanca Sparrow  today in an effort to screen for coronavirus symptoms and also to perform their scheduled follow-up visit remotely in light of the coronavirus pandemic.  With respect to their specific risks for coronavirus, their screen is as follows:      COVID-19 RISK SCREEN     1. Has the patient had close contact without PPE with a lab confirmed COVID-19 (+) person or a person under investigation (PUI) for COVID-19 infection?  -- No     2. Has the patient had respiratory symptoms, worsened/new cough and/or SOA, unexplained fever, or sudden loss of smell and/or taste in the past 7 days? --  No    3. Does the patient have baseline higher exposure risk such as working in healthcare field or currently residing in healthcare facility?  --  No       They also denied any new respiratory symptoms or fever within the past 14 days.    I counseled them regarding safe practices for minimizing risk for infection including hand-washing, self-isolation, limiting contacts, and we also discussed what to do should they develop symptoms including fever, chills, new cough, shortness of breath, or new body aches.    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 female with past medical history of hypertension, GERD, hyperlipidemia, seasonal allergies, thoracic aortic aneurysm who presents today for evaluation of anemia and  thrombocytopenia. The patient states that over the past few months she has been having worsening fatigue.  Denies any unexplained fevers, chills, weight loss, night sweats.  She was seen by her PCP at the beginning of January for routine health evaluation.  At that time she had labs checked which were notable for hemoglobin of 9.7 and a platelet count of 40K.  Previously she had labs in December 2020 which showed hemoglobin of 12 and a platelet count of 150K.  WBC has been within normal limits.  Iron studies, folate, vitamin B12, SPEP with immunofixation were within normal limits.  She denies any family history of leukemia or lymphoma.  She denies any easy bleeding or bruising episodes.  Does note that she had a viral illness in December 2021.  States that she has had multiple COVID-19 test which have all been negative.  She does note receiving the vaccine in January/February 2021 and her booster in August 2021.  She otherwise denies any new medication changes     Interval History:  Presents clinic for follow-up.  Denies any worsening bruising or bleeding episodes.  Does note some fatigue with exertion but was able to go on a recent trip to the [place] and able to do some hiking and exercising.  Denies any unexplained fevers, chills, night sweats, weight loss    ROS:  A 10 point review of systems was complete is otherwise negative    PMH/PSH/FH/SH:   Reviewed and unchanged since July 2022    EXAM FINDINGS AND/OR PROBLEMS:  General: Conversant, no acute distress  Neuro: Alert and orient x3  Psych: Mood congruent    ASSESSMENT/PLAN:   1.  Low-grade myelodysplastic syndrome  -Hemoglobin 9.7 in January 2022.  Previously 12 December 2020  -Review of outside records notable for normal iron studies, jordin B12, folate, SPEP  -LDH, haptoglobin, SAM within normal limits  -Copper, zinc levels within normal limits  -Peripheral smear without evidence of schistocytes  -Reticulocyte count mildly elevated  -Bone marrow biopsy  consistent with a low-grade MDS.  FISH panel negative and cytogenetics within normal limits  -Repeat platelet count in July 2022 34K which is lower than around 45K 3 months ago  -Repeat labs on 7/27/2022 stable from previous labs checked on 7/12/2022  -Patient would like to continue active surveillance at this time  -She is moving to [place] at the end of this month.  Advised her to find a hematologist [facility] when she arrives to monitor her blood counts for need for transfusion versus treatment with a hyper methylating agent.  Advised that she would likely benefit from referral to [facility]    RECOMMENDATIONS:  Follow-up as needed    Braydon Espinal MD

## 2022-08-10 ENCOUNTER — TELEPHONE (OUTPATIENT)
Dept: ONCOLOGY | Facility: CLINIC | Age: 77
End: 2022-08-10

## 2022-08-10 NOTE — TELEPHONE ENCOUNTER
Tamar with Dr. Josiah Wagner's office left a voicemail patient had labs there yesterday, and she will be faxing over these results.

## 2022-08-11 ENCOUNTER — TELEPHONE (OUTPATIENT)
Dept: ONCOLOGY | Facility: CLINIC | Age: 77
End: 2022-08-11

## 2022-08-11 NOTE — TELEPHONE ENCOUNTER
----- Message from Eri DESAI Manav sent at 8/11/2022  9:47 AM EDT -----  Regarding: [facility]-LABS  Contact: [phone]  Tamar from Dr. Kaiser office called wants to make sure the labs that were faxed have been reviewed on this patient, if so Dr. Kaiser wants to know what the plan is. When calling back hit option 1.

## 2022-08-11 NOTE — TELEPHONE ENCOUNTER
Call back to Tamar at Dr Kaiser office.  Reports platelets  At 27.  Requested she refax labs for Dr Espinal to review

## 2022-08-11 NOTE — TELEPHONE ENCOUNTER
----- Message from Eri DESAI Manav sent at 8/11/2022  9:47 AM EDT -----  Regarding: [facility]-LABS  Contact: [phone]  Tamar from Dr. Kaiser office called wants to make sure the labs that were faxed have been reviewed on this patient, if so Dr. Kaiser wants to know what the plan is. When calling back hit option 1.

## 2022-08-11 NOTE — TELEPHONE ENCOUNTER
Call back to Tamar at Dr Wagner's office.  Dr Espinal reviewed lab and at this point would not treat unless platelets dropped below 20.  Also notified Tamar that Blanca is moving to [place] and needs to establish care with a hematologist there.  Recommended [facility].  Tamar states understood

## 2022-08-11 NOTE — TELEPHONE ENCOUNTER
----- Message from Eri DESAI Manav sent at 8/11/2022  9:47 AM EDT -----  Regarding: [facility]  Contact: [phone]  Tamar from Dr. Kaiser office called wants to make sure the labs that were faxed have been reviewed on this patient, if so Dr. Kaiser wants to know what the plan is. When calling back hit option 1.

## 2022-08-17 ENCOUNTER — OFFICE VISIT (OUTPATIENT)
Dept: GYNECOLOGIC ONCOLOGY | Facility: CLINIC | Age: 77
End: 2022-08-17

## 2022-08-17 ENCOUNTER — HOSPITAL ENCOUNTER (OUTPATIENT)
Dept: MRI IMAGING | Facility: HOSPITAL | Age: 77
Discharge: HOME | End: 2022-08-17
Admitting: NURSE PRACTITIONER

## 2022-08-17 VITALS
TEMPERATURE: 97.5 F | HEART RATE: 95 BPM | RESPIRATION RATE: 15 BRPM | OXYGEN SATURATION: 98 % | DIASTOLIC BLOOD PRESSURE: 54 MMHG | SYSTOLIC BLOOD PRESSURE: 111 MMHG | WEIGHT: 234.1 LBS | BODY MASS INDEX: 36.66 KG/M2

## 2022-08-17 DIAGNOSIS — Z91.89 AT HIGH RISK FOR BREAST CANCER: Primary | ICD-10-CM

## 2022-08-17 DIAGNOSIS — Z91.89 AT HIGH RISK FOR BREAST CANCER: ICD-10-CM

## 2022-08-17 LAB — CREAT BLDA-MCNC: 1 MG/DL (ref 0.6–1.3)

## 2022-08-17 PROCEDURE — 0 GADOBENATE DIMEGLUMINE 529 MG/ML SOLUTION: Performed by: NURSE PRACTITIONER

## 2022-08-17 PROCEDURE — 77049 MRI BREAST C-+ W/CAD BI: CPT | Performed by: RADIOLOGY

## 2022-08-17 PROCEDURE — 82565 ASSAY OF CREATININE: CPT

## 2022-08-17 PROCEDURE — A9577 INJ MULTIHANCE: HCPCS | Performed by: NURSE PRACTITIONER

## 2022-08-17 PROCEDURE — 99213 OFFICE O/P EST LOW 20 MIN: CPT | Performed by: NURSE PRACTITIONER

## 2022-08-17 PROCEDURE — 77049 MRI BREAST C-+ W/CAD BI: CPT

## 2022-08-17 RX ADMIN — GADOBENATE DIMEGLUMINE 19 ML: 529 INJECTION, SOLUTION INTRAVENOUS at 16:06

## 2022-08-17 NOTE — PROGRESS NOTES
GYN ONCOLOGY HIGH RISK CLINIC    Blanca Sparrow  [id number]  1945    Chief Complaint:   Chief Complaint   Patient presents with   • Follow-up     High risk breast       HISTORY OF PRESENT ILLNESS:    Blanca Sparrow is a 77 y.o. year old female here today for her high risk visit. She has a strong family history of breast cancer with a lifetime risk of developing breast cancer estimated to be 26%. Since our last visit she has been diagnosed with low-grade myelodysplastic syndrome, confirmed with bone marrow biopsy, being followed by Dr. Espinal of our Hematology Oncology team. She sees Dr. Wagner for primary care, routine care, and management of chronic illnesses.     Patient is completing regular home self breast exams and has no new breast complaints today. Her current breast screening regimen includes yearly mammograms, last 11/8/2021, and yearly screening breast MRI is scheduled for this afternoon. Patient reports she is moving to [place], [place] next week to be closer to her son and granddaughters. She has been referred to [facility] for her hematologic care.       Past Medical History:   Diagnosis Date   • Aortic aneurysm, abdominal (HCC)    • Arthritis    • GERD (gastroesophageal reflux disease)    • High cholesterol    • Hypertension        Past Surgical History:   Procedure Laterality Date   • ACHILLES TENDON SURGERY     • APPENDECTOMY     • BREAST BIOPSY Right     BENIGN CORE BX, PT UNSURE OF DATE   • HYSTERECTOMY     • OOPHORECTOMY     • OTHER SURGICAL HISTORY      APPEY   • SKIN BIOPSY Right 08/2021    Upper Arm   • SKIN CANCER EXCISION     • TONSILLECTOMY     • TOTAL HIP ARTHROPLASTY REVISION Right    • TOTAL KNEE ARTHROPLASTY Left        Family History   Problem Relation Age of Onset   • Heart disease Mother    • Osteoarthritis Mother    • Breast cancer Mother 50   • Heart disease Father    • Diabetes Father    • Stroke Father    • Breast cancer Sister 38   • Ovarian cancer Maternal  Grandmother         ?       Health Maintenance:    Regular self breast exam: yes  Mammogram: 10/13/2020, BiRADS I. History of abnormal mammogram: no  Breast MRI: 7/14/2021, BiRADS II. Results: negative    Risk Asessment: 26%    Allergies: No Known Allergies    Medications: Medications have been reviewed in the chart and reconciled.     Review of Systems   Constitutional: Positive for fatigue.   Gastrointestinal: Negative.    Genitourinary: Negative.        Physical Exam  Vital Signs: /54   Pulse 95   Temp 97.5 °F (36.4 °C)   Resp 15   Wt 106 kg (234 lb 1.6 oz)   SpO2 98%   BMI 36.66 kg/m²   Pain Score    08/17/22 1340   PainSc: 0-No pain      General Appearance:  alert, cooperative, no apparent distress, appears stated age and obese by BMI criteria   Neurologic/Psychiatric: A&O x 3, gait steady, appropriate affect   HEENT:  Normocephalic, without obvious abnormality, mucous membranes moist   Lungs:   Clear to auscultation bilaterally; respirations regular, even, and unlabored bilaterally   Heart:  Regular rate and rhythm, no murmurs appreciated   Breasts:  Symmetrical, no masses, no lesions and no nipple discharge   Abdomen:   Soft, non-tender, non-distended and no organomegaly   Lymph nodes: No axillary adenopathy noted   Extremities: Normal, atraumatic; no clubbing, cyanosis, or edema    Skin: No rashes, ulcers, or suspicious lesions noted   Pelvic: deferred     Results Review:  The following data was reviewed by: BIRGIT Foley on 08/17/2022:  CBC w/diff    CBC w/Diff 4/20/22 7/12/22 7/27/22   WBC 4.30 4.00 4.27   RBC 3.39 (A) 2.90 (A) 2.68 (A)   Hemoglobin 11.0 (A) 9.8 (A) 9.6 (A)   Hematocrit 35.1 30.6 (A) 29.0 (A)   .5 (A) 105.5 (A) 108.2 (A)   MCH 32.4 33.9 (A) 35.8 (A)   MCHC 31.3 (A) 32.2 33.1   RDW 15.4 16.2 (A) 14.5   Platelets 46 (A) 34 (A) 33 (A)   Neutrophil Rel % 47.4 53.1 38.4 (A)   Immature Granulocyte Rel %   0.2   Lymphocyte Rel % 46.4 (A) 39.6 51.8 (A)   Monocyte  Rel % 6.2 7.3 8.2   Eosinophil Rel %   1.4   Basophil Rel %   0.0   (A) Abnormal value       Comments are available for some flowsheets but are not being displayed.           Data reviewed: Radiologic studies Breast imaging from 2021, bone marrow biopsy results       Assessment and Plan:    Diagnoses and all orders for this visit:    1. At high risk for breast cancer (Primary)  -     Ambulatory Referral to Gynecologic Oncology High Risk ([facility] only)        Patient Education:  Reviewed screening schedule related to diagnosis as follows:  Monthly breast self-exam starting at age 18.  Clinical breast exam every 6 months.  Annual mammogram starting at age 40, or 10 years prior to the earliest diagnosis in the family.   Annual MRI starting at age 40, or 10 years prior to the earliest diagnosis in the family.   Consider chemoprevention for breast cancer risk reduction (tamoxifen/evista).     Discussed the purpose of high risk clinic in coordinating, scheduling, and planning screening interventions to include yearly mammograms, yearly screening breast MRI, Q6 month CBE, and monthly SBE. Colonoscopy and GYN screening per national guidelines.   Reviewed technique for SBE including concerning findings to report.  Discussed yearly screening mammogram. Results will be reported by breast imaging center and a copy of report will be sent to our office for review. Mammogram due to repeat in 11/2022.    Discussed screening breast MRI scheduling based on menstrual cycles, HRT. We also discussed the increased sensitivity of MRI screening and possibility of call- backs for additional imaging or biopsies to establish baseline. Repeat MRI this afternoon.   Discussed vitamin D as benefit to bone health and possible benefit to breast health. Recommended 1000 IU daily unless contraindicated  Discussed benefit of Vitamin E for fibrocystic breasts/breast pain    Patient is moving to [place], [place] next week. She has been referred to [facility] there to establish hematologic care ASAP. Patient states she may transfer her high risk care there as well, but may keep here as she will be traveling back to visit family and friends several times per year. Either is fine with us. Happy to help any way we can.       Return to clinic in 1 year for High Risk visit.  Will also offer patient referral to high risk clinic in [place], [place].       BIRGIT Foley

## 2022-08-19 ENCOUNTER — TELEPHONE (OUTPATIENT)
Dept: MRI IMAGING | Facility: HOSPITAL | Age: 77
End: 2022-08-19

## 2022-08-19 NOTE — TELEPHONE ENCOUNTER
Called patient with MRI Breast results. Pt will get with Dr. Wagner for his recommendation of bone imaging.  She is moving to [place] at the end of the month. Pt expressed understanding and was encouraged to call with any further questions or concerns.

## 2022-08-23 ENCOUNTER — TELEPHONE (OUTPATIENT)
Dept: GYNECOLOGIC ONCOLOGY | Facility: CLINIC | Age: 77
End: 2022-08-23

## 2022-08-23 NOTE — TELEPHONE ENCOUNTER
Called patient and notified of breast MRI results. Negative per breast perspective. Radiology noted enhancement at the sternum and several ribs, needs further work-up especially considering her recent diagnosis with low-grade MDS. Patient's movers came today and she is traveling to [place] later this week permanently. She has been referred to [facility] in [place] and already has a new patient appointment scheduled. Patient understanding to notify them of findings from her MRI and recommendations for a bone scan.

## 2022-10-05 LAB
LAB AP ASPIRATE SMEAR: NORMAL
LAB AP CASE REPORT: NORMAL
LAB AP CBC AND DIFFERENTIAL: NORMAL
LAB AP CLINICAL INFORMATION: NORMAL
LAB AP CLOT SECTION: NORMAL
LAB AP CORE BIOPSY: NORMAL
LAB AP DIAGNOSIS COMMENT: NORMAL
LAB AP FLOW CYTOMETRY SUMMARY: NORMAL
LAB AP INTEGRATED ONCOLOGY, ADDENDUM: NORMAL
LAB AP OUTSIDE REPORT, ADDENDUM: NORMAL
PATH REPORT.FINAL DX SPEC: NORMAL
PATH REPORT.GROSS SPEC: NORMAL

## 2022-11-21 DIAGNOSIS — I71.20 THORACIC AORTIC ANEURYSM WITHOUT RUPTURE, UNSPECIFIED PART: Primary | ICD-10-CM

## 2022-12-21 ENCOUNTER — TELEPHONE (OUTPATIENT)
Dept: CARDIAC SURGERY | Facility: CLINIC | Age: 77
End: 2022-12-21

## 2022-12-21 NOTE — TELEPHONE ENCOUNTER
Caller: Blanca Sparrow    Relationship: Self    Best call back number: [phone]    What orders are you requesting (i.e. lab or imaging): CT     In what timeframe would the patient need to come in: PATIENT HAS A APPOINTMENT ON 1/19/2023 @10:AM    Where will you receive your lab/imaging services: [facility]

## 2023-01-04 ENCOUNTER — TRANSCRIBE ORDERS (OUTPATIENT)
Dept: ADMINISTRATIVE | Facility: HOSPITAL | Age: 78
End: 2023-01-04
Payer: COMMERCIAL

## 2023-01-04 DIAGNOSIS — Z12.31 VISIT FOR SCREENING MAMMOGRAM: Primary | ICD-10-CM

## 2023-01-13 ENCOUNTER — HOSPITAL ENCOUNTER (OUTPATIENT)
Dept: MAMMOGRAPHY | Facility: HOSPITAL | Age: 78
Discharge: HOME | End: 2023-01-13
Admitting: INTERNAL MEDICINE
Payer: COMMERCIAL

## 2023-01-13 DIAGNOSIS — Z12.31 VISIT FOR SCREENING MAMMOGRAM: ICD-10-CM

## 2023-01-13 PROCEDURE — 77063 BREAST TOMOSYNTHESIS BI: CPT | Performed by: RADIOLOGY

## 2023-01-13 PROCEDURE — 77063 BREAST TOMOSYNTHESIS BI: CPT

## 2023-01-13 PROCEDURE — 77067 SCR MAMMO BI INCL CAD: CPT

## 2023-01-13 PROCEDURE — 77067 SCR MAMMO BI INCL CAD: CPT | Performed by: RADIOLOGY

## 2023-01-16 ENCOUNTER — HOSPITAL ENCOUNTER (OUTPATIENT)
Dept: CT IMAGING | Facility: HOSPITAL | Age: 78
Discharge: HOME | End: 2023-01-16
Admitting: NURSE PRACTITIONER
Payer: COMMERCIAL

## 2023-01-16 DIAGNOSIS — I71.20 THORACIC AORTIC ANEURYSM WITHOUT RUPTURE, UNSPECIFIED PART: ICD-10-CM

## 2023-01-16 LAB — CREAT BLDA-MCNC: 0.9 MG/DL (ref 0.6–1.3)

## 2023-01-16 PROCEDURE — 82565 ASSAY OF CREATININE: CPT

## 2023-01-16 PROCEDURE — 71275 CT ANGIOGRAPHY CHEST: CPT

## 2023-01-16 PROCEDURE — 0 IOPAMIDOL PER 1 ML: Performed by: NURSE PRACTITIONER

## 2023-01-16 RX ADMIN — IOPAMIDOL 85 ML: 755 INJECTION, SOLUTION INTRAVENOUS at 15:52

## 2023-01-19 ENCOUNTER — OFFICE VISIT (OUTPATIENT)
Dept: CARDIAC SURGERY | Facility: CLINIC | Age: 78
End: 2023-01-19
Payer: COMMERCIAL

## 2023-01-19 VITALS
DIASTOLIC BLOOD PRESSURE: 72 MMHG | BODY MASS INDEX: 36.1 KG/M2 | WEIGHT: 230 LBS | HEART RATE: 86 BPM | TEMPERATURE: 98.6 F | HEIGHT: 67 IN | OXYGEN SATURATION: 96 % | SYSTOLIC BLOOD PRESSURE: 132 MMHG

## 2023-01-19 DIAGNOSIS — I71.21 ANEURYSM OF ASCENDING AORTA WITHOUT RUPTURE: Primary | ICD-10-CM

## 2023-01-19 PROCEDURE — 99214 OFFICE O/P EST MOD 30 MIN: CPT | Performed by: NURSE PRACTITIONER

## 2023-01-19 RX ORDER — UBIDECARENONE 75 MG
CAPSULE ORAL
COMMUNITY

## 2023-01-19 NOTE — PROGRESS NOTES
[facility] Cardiothoracic Surgery Office Follow Up Note     Date of Encounter: 2023     Name: Blanca Sparrow  : 1945     Referred By: No ref. provider found  PCP: Josiah Wagner MD    Chief Complaint:    Chief Complaint   Patient presents with   • Follow-up     1 year follow up with CTA for TAA- Pt states that she is SOB, always fatigued and no stamina. Pt states that she was dx with anemia last year and now gets a transfusion once a week since , she knows this is the cause of her fatigue. Sometimes dizzy but denies any lower leg swelling or pain.        Subjective      History of Present Illness:  Blanca Sparrow is a 77 y.o. female with a history of hypertension and ascending aortic aneurysm.  Patient presents today for 12-month follow-up with CTA of the chest.  Last seen in the office on 10/18/21 with interval enlargement of her ascending thoracic aortic aneurysm for 4.2 cm in  to 4.6 cm in .  Therefore interval imaging schedule was changed from 18 months to 12 months.  She denies any chest pain or unusual back or flank pain.  She does have baseline low back pain.  Home blood pressures have been stable with most recent 104/60.  She is followed closely by her PCP, Dr. Wagner.  Since last visit patient has been diagnosed with low-grade myelodysplastic syndrome confirmed with bone marrow biopsy.  She is following with hematology, Dr. Espinal.  She also follows regularly with OB/GYN due to strong family history of breast cancer.    Review of Systems:  Review of Systems   Constitutional: Positive for decreased appetite (appetite waver's from time to time). Negative for chills, diaphoresis, fever, malaise/fatigue, night sweats and weight loss.   HENT: Positive for congestion (allergies/sinus). Negative for hoarse voice, sore throat and stridor.    Cardiovascular: Positive for dyspnea on exertion. Negative for chest pain, claudication, irregular heartbeat, leg swelling,  near-syncope, orthopnea, palpitations, paroxysmal nocturnal dyspnea and syncope.   Respiratory: Positive for cough and shortness of breath. Negative for hemoptysis, sleep disturbances due to breathing, snoring, sputum production and wheezing.    Hematologic/Lymphatic: Positive for bleeding problem. Negative for adenopathy. Bruises/bleeds easily.   Skin: Positive for poor wound healing. Negative for color change, dry skin, itching and rash.   Musculoskeletal: Positive for arthritis, back pain, joint pain, joint swelling and muscle cramps (sometimes). Negative for falls and muscle weakness.   Gastrointestinal: Negative for abdominal pain, anorexia, constipation, diarrhea, hematochezia, melena, nausea and vomiting.   Neurological: Positive for light-headedness (somtimes ). Negative for difficulty with concentration, disturbances in coordination, dizziness, loss of balance, numbness, seizures, vertigo and weakness.   Psychiatric/Behavioral: Negative for altered mental status, depression, memory loss and substance abuse. The patient does not have insomnia and is not nervous/anxious.    Allergic/Immunologic: Positive for environmental allergies. Negative for persistent infections.       I have reviewed the following portions of the patient's history: allergies, current medications, past family history, past medical history, past social history, past surgical history, problem list and ROS and confirm it's accurate.    Allergies:  No Known Allergies    Medications:      Current Outpatient Medications:   •  Calcium Carbonate-Vitamin D (CALCIUM + D PO), Take  by mouth daily., Disp: , Rfl:   •  cetirizine (zyrTEC) 10 MG tablet, Take 10 mg by mouth Daily., Disp: , Rfl:   •  Cholecalciferol (VITAMIN D-3 PO), Take 5,000 Units by mouth daily., Disp: , Rfl:   •  cyclobenzaprine (FLEXERIL) 10 MG tablet, Take 10 mg by mouth 3 (Three) Times a Day As Needed., Disp: , Rfl: 3  •  montelukast (SINGULAIR) 10 MG tablet, Take 10 mg by mouth  daily., Disp: , Rfl:   •  Olmesartan Medoxomil (BENICAR PO), Take 30 mg by mouth Daily., Disp: , Rfl:   •  omeprazole (PriLOSEC) 40 MG capsule, Take 40 mg by mouth daily., Disp: , Rfl:   •  pravastatin (PRAVACHOL) 40 MG tablet, Take 80 mg by mouth Daily., Disp: , Rfl:   •  traZODone (DESYREL) 50 MG tablet, trazodone 50 mg tablet, Disp: , Rfl:   •  venlafaxine XR (EFFEXOR-XR) 150 MG 24 hr capsule, Take 150 mg by mouth daily., Disp: , Rfl:   •  vitamin B-12 (CYANOCOBALAMIN) 100 MCG tablet, Vitamin B12  5000 mg po qd, Disp: , Rfl:     History:   Past Medical History:   Diagnosis Date   • Aortic aneurysm, abdominal    • Arthritis    • GERD (gastroesophageal reflux disease)    • High cholesterol    • Hypertension        Past Surgical History:   Procedure Laterality Date   • ACHILLES TENDON SURGERY     • APPENDECTOMY     • BREAST BIOPSY Right     BENIGN CORE BX, PT UNSURE OF DATE   • HYSTERECTOMY      POST MENOPAUSAL AGE 60-65   • OOPHORECTOMY Bilateral     POST MENOPAUSAL AGE 60-65   • OTHER SURGICAL HISTORY      APPEY   • SKIN BIOPSY Right 2021    Upper Arm   • SKIN CANCER EXCISION     • TONSILLECTOMY     • TOTAL HIP ARTHROPLASTY REVISION Right    • TOTAL KNEE ARTHROPLASTY Left        Social History     Socioeconomic History   • Marital status:    • Number of children: 1   Tobacco Use   • Smoking status: Former     Packs/day: 1.00     Years: 13.00     Pack years: 13.00     Types: Cigarettes     Start date:      Quit date:      Years since quittin.0   • Smokeless tobacco: Never   • Tobacco comments:     Pt used nicoret since quit smoking    Vaping Use   • Vaping Use: Never used   Substance and Sexual Activity   • Alcohol use: No   • Drug use: No   • Sexual activity: Never        Family History   Problem Relation Age of Onset   • Heart disease Mother    • Osteoarthritis Mother    • Breast cancer Mother 50   • Heart disease Father    • Diabetes Father    • Stroke Father    • Breast cancer Sister 38  "  • Ovarian cancer Maternal Grandmother         DX AGE UNKNOWN       Objective     Physical Exam:  Vitals:    01/19/23 1127   BP: 132/72   Pulse: 86   Temp: 98.6 °F (37 °C)   SpO2: 96%   Weight: 104 kg (230 lb)   Height: 170.2 cm (67\")      Body mass index is 36.02 kg/m².    Physical Exam  Vitals reviewed.   Constitutional:       General: She is not in acute distress.     Appearance: She is well-developed and well-groomed. She is not toxic-appearing.   HENT:      Head: Normocephalic and atraumatic.   Eyes:      General: Lids are normal.      Conjunctiva/sclera: Conjunctivae normal.      Pupils: Pupils are equal, round, and reactive to light.   Neck:      Vascular: No carotid bruit.   Cardiovascular:      Rate and Rhythm: Normal rate and regular rhythm.      Heart sounds: S1 normal and S2 normal. No murmur heard.  Pulmonary:      Effort: Pulmonary effort is normal. No respiratory distress.      Breath sounds: Normal breath sounds. No decreased breath sounds, wheezing, rhonchi or rales.   Musculoskeletal:         General: Normal range of motion.      Cervical back: Normal range of motion and neck supple.      Right lower leg: No edema.      Left lower leg: No edema.   Lymphadenopathy:      Cervical: No cervical adenopathy.   Skin:     General: Skin is warm and dry.      Capillary Refill: Capillary refill takes less than 2 seconds.   Neurological:      General: No focal deficit present.      Mental Status: She is alert and oriented to person, place, and time.   Psychiatric:         Mood and Affect: Mood normal.         Behavior: Behavior normal. Behavior is cooperative.       Imaging/Labs:  CT Angiogram Chest: 1/17/2023 personally reviewed with measurement of a sending aortic aneurysm 4.5 cm.  The study was compared to CTA imaging 9/23/2021 at which time the ascending aorta measured 4.6 cm.    Impression: 1. No change in fusiform ascending aortic aneurysm measuring 4.4 cm in diameter. Electronically Signed: Booker " Juan A  1/17/2023 7:00 PM EST  Workstation ID: [id number]     CT Angiogram Chest With & Without Contrast: 9/26/2021  Persistent stable appearance of the ascending thoracic aortic aneurysm with fusiform aneurysmal dilatation of to 4.6 cm unchanged from 06/16/2021 comparison examination. No acute findings otherwise specifically no dissection or periaortic inflammation  DICTATED:   09/25/2021 EDITED/ls :   09/25/2021   This report was finalized on 9/26/2021 6:06 PM by Dr. Amadou Rodgers.        Assessment / Plan      Assessment / Plan:    1. Aneurysm of ascending aorta without rupture (Primary)  - 77 y.o. female with a history of hypertension, myelodysplastic syndrome, and ascending aortic aneurysm.    - Patient presents today for 12-month follow-up with CTA of the chest.    - Last seen in the office on 10/18/21 with interval enlargement of her ascending thoracic aortic aneurysm for 4.2 cm(2019) to 4.6 cm (2021).    - She denies any chest pain or unusual back or flank pain.    - Home blood pressures have been stable and @ goal    - Most recent CTA measured ascending aortic aneurysm stable at 4.5 cm  -We will plan to continue annual surveillance with CTA in 12-month       Patient Education: Continue to avoid tobacco use.  Maintain controlled blood pressures less than 140/80 mmHg.  Maintain weekly aerobic exercise at 150 minutes/week.      Follow Up:   Return in about 1 year (around 1/19/2024) for CTA chest for aneurysm surveillance.   Or sooner for any further concerns or worsening sign and symptoms. If unable to reach us in the office please dial 911 or go to the nearest emergency department.      BIRGIT Pak  [facility] Cardioth[facility]acic [facility]    Time Spent: I spent 31 minutes caring for Blanca on this date of service. This time includes time spent by me in the following activities: preparing for the visit, reviewing tests, obtaining and/or reviewing a separately obtained history, performing a medically  appropriate examination and/or evaluation, counseling and educating the patient/family/caregiver, ordering medications, tests, or procedures, documenting information in the medical record, independently interpreting results and communicating that information with the patient/family/caregiver and care coordination.

## 2023-01-20 ENCOUNTER — HOSPITAL ENCOUNTER (OUTPATIENT)
Dept: ULTRASOUND IMAGING | Facility: HOSPITAL | Age: 78
Discharge: HOME | End: 2023-01-20
Payer: COMMERCIAL

## 2023-01-20 ENCOUNTER — HOSPITAL ENCOUNTER (OUTPATIENT)
Dept: MAMMOGRAPHY | Facility: HOSPITAL | Age: 78
Discharge: HOME | End: 2023-01-20
Payer: COMMERCIAL

## 2023-01-20 DIAGNOSIS — R92.8 ABNORMAL MAMMOGRAM: ICD-10-CM

## 2023-01-20 PROCEDURE — 76642 ULTRASOUND BREAST LIMITED: CPT

## 2023-01-20 PROCEDURE — 77065 DX MAMMO INCL CAD UNI: CPT

## 2023-01-20 PROCEDURE — G0279 TOMOSYNTHESIS, MAMMO: HCPCS

## 2023-01-20 PROCEDURE — 77061 BREAST TOMOSYNTHESIS UNI: CPT | Performed by: RADIOLOGY

## 2023-01-20 PROCEDURE — 77065 DX MAMMO INCL CAD UNI: CPT | Performed by: RADIOLOGY

## 2023-01-20 PROCEDURE — 76642 ULTRASOUND BREAST LIMITED: CPT | Performed by: RADIOLOGY

## 2023-02-02 ENCOUNTER — APPOINTMENT (OUTPATIENT)
Dept: MAMMOGRAPHY | Facility: HOSPITAL | Age: 78
End: 2023-02-02
Payer: COMMERCIAL

## 2024-01-20 ENCOUNTER — HOSPITAL ENCOUNTER (OUTPATIENT)
Facility: HOSPITAL | Age: 79
Setting detail: OBSERVATION
Discharge: HOME | End: 2024-01-22
Attending: EMERGENCY MEDICINE | Admitting: HOSPITALIST
Payer: COMMERCIAL

## 2024-01-20 ENCOUNTER — APPOINTMENT (OUTPATIENT)
Dept: GENERAL RADIOLOGY | Facility: HOSPITAL | Age: 79
End: 2024-01-20
Payer: COMMERCIAL

## 2024-01-20 DIAGNOSIS — R53.1 GENERALIZED WEAKNESS: ICD-10-CM

## 2024-01-20 DIAGNOSIS — D69.6 THROMBOCYTOPENIA: Primary | ICD-10-CM

## 2024-01-20 DIAGNOSIS — N17.9 ACUTE KIDNEY INJURY: ICD-10-CM

## 2024-01-20 DIAGNOSIS — R04.0 EPISTAXIS: ICD-10-CM

## 2024-01-20 DIAGNOSIS — D64.9 ACUTE ANEMIA: ICD-10-CM

## 2024-01-20 LAB
ALBUMIN SERPL-MCNC: 3.5 G/DL (ref 3.5–5.2)
ALBUMIN/GLOB SERPL: 1.1 G/DL
ALP SERPL-CCNC: 94 U/L (ref 39–117)
ALT SERPL W P-5'-P-CCNC: 7 U/L (ref 1–33)
ANION GAP SERPL CALCULATED.3IONS-SCNC: 13 MMOL/L (ref 5–15)
AST SERPL-CCNC: 13 U/L (ref 1–32)
BASOPHILS # BLD AUTO: 0 10*3/MM3 (ref 0–0.2)
BASOPHILS NFR BLD AUTO: 0 % (ref 0–1.5)
BILIRUB SERPL-MCNC: 0.7 MG/DL (ref 0–1.2)
BUN SERPL-MCNC: 28 MG/DL (ref 8–23)
BUN/CREAT SERPL: 16.2 (ref 7–25)
CALCIUM SPEC-SCNC: 9 MG/DL (ref 8.6–10.5)
CHLORIDE SERPL-SCNC: 97 MMOL/L (ref 98–107)
CO2 SERPL-SCNC: 22 MMOL/L (ref 22–29)
CREAT SERPL-MCNC: 1.73 MG/DL (ref 0.57–1)
D DIMER PPP FEU-MCNC: 0.6 MCGFEU/ML (ref 0–0.76)
DEPRECATED RDW RBC AUTO: 62.3 FL (ref 37–54)
EGFRCR SERPLBLD CKD-EPI 2021: 30.3 ML/MIN/1.73
EOSINOPHIL # BLD AUTO: 0.05 10*3/MM3 (ref 0–0.4)
EOSINOPHIL NFR BLD AUTO: 0.9 % (ref 0.3–6.2)
ERYTHROCYTE [DISTWIDTH] IN BLOOD BY AUTOMATED COUNT: 15 % (ref 12.3–15.4)
FLUAV RNA RESP QL NAA+PROBE: NOT DETECTED
FLUBV RNA RESP QL NAA+PROBE: NOT DETECTED
GLOBULIN UR ELPH-MCNC: 3.2 GM/DL
GLUCOSE SERPL-MCNC: 134 MG/DL (ref 65–99)
HCT VFR BLD AUTO: 23.7 % (ref 34–46.6)
HGB BLD-MCNC: 7.8 G/DL (ref 12–15.9)
HOLD SPECIMEN: NORMAL
HOLD SPECIMEN: NORMAL
IMM GRANULOCYTES # BLD AUTO: 0.02 10*3/MM3 (ref 0–0.05)
IMM GRANULOCYTES NFR BLD AUTO: 0.4 % (ref 0–0.5)
LYMPHOCYTES # BLD AUTO: 1.59 10*3/MM3 (ref 0.7–3.1)
LYMPHOCYTES NFR BLD AUTO: 28.5 % (ref 19.6–45.3)
MACROCYTES BLD QL SMEAR: NORMAL
MCH RBC QN AUTO: 37 PG (ref 26.6–33)
MCHC RBC AUTO-ENTMCNC: 32.9 G/DL (ref 31.5–35.7)
MCV RBC AUTO: 112.3 FL (ref 79–97)
MONOCYTES # BLD AUTO: 0.56 10*3/MM3 (ref 0.1–0.9)
MONOCYTES NFR BLD AUTO: 10.1 % (ref 5–12)
NEUTROPHILS NFR BLD AUTO: 3.35 10*3/MM3 (ref 1.7–7)
NEUTROPHILS NFR BLD AUTO: 60.1 % (ref 42.7–76)
NRBC BLD AUTO-RTO: 0 /100 WBC (ref 0–0.2)
NT-PROBNP SERPL-MCNC: 1126 PG/ML (ref 0–1800)
PLAT MORPH BLD: NORMAL
PLATELET # BLD AUTO: 24 10*3/MM3 (ref 140–450)
PMV BLD AUTO: 12.2 FL (ref 6–12)
POTASSIUM SERPL-SCNC: 4.2 MMOL/L (ref 3.5–5.2)
PROT SERPL-MCNC: 6.7 G/DL (ref 6–8.5)
QT INTERVAL: 366 MS
QTC INTERVAL: 397 MS
RBC # BLD AUTO: 2.11 10*6/MM3 (ref 3.77–5.28)
SARS-COV-2 RNA RESP QL NAA+PROBE: NOT DETECTED
SODIUM SERPL-SCNC: 132 MMOL/L (ref 136–145)
TROPONIN T SERPL HS-MCNC: 23 NG/L
WBC MORPH BLD: NORMAL
WBC NRBC COR # BLD AUTO: 5.57 10*3/MM3 (ref 3.4–10.8)
WHOLE BLOOD HOLD COAG: NORMAL
WHOLE BLOOD HOLD SPECIMEN: NORMAL

## 2024-01-20 PROCEDURE — 83880 ASSAY OF NATRIURETIC PEPTIDE: CPT | Performed by: EMERGENCY MEDICINE

## 2024-01-20 PROCEDURE — 71045 X-RAY EXAM CHEST 1 VIEW: CPT

## 2024-01-20 PROCEDURE — 93005 ELECTROCARDIOGRAM TRACING: CPT | Performed by: EMERGENCY MEDICINE

## 2024-01-20 PROCEDURE — 85379 FIBRIN DEGRADATION QUANT: CPT | Performed by: EMERGENCY MEDICINE

## 2024-01-20 PROCEDURE — 85025 COMPLETE CBC W/AUTO DIFF WBC: CPT | Performed by: EMERGENCY MEDICINE

## 2024-01-20 PROCEDURE — 80053 COMPREHEN METABOLIC PANEL: CPT | Performed by: EMERGENCY MEDICINE

## 2024-01-20 PROCEDURE — 99284 EMERGENCY DEPT VISIT MOD MDM: CPT

## 2024-01-20 PROCEDURE — 86901 BLOOD TYPING SEROLOGIC RH(D): CPT

## 2024-01-20 PROCEDURE — 85007 BL SMEAR W/DIFF WBC COUNT: CPT | Performed by: EMERGENCY MEDICINE

## 2024-01-20 PROCEDURE — 87636 SARSCOV2 & INF A&B AMP PRB: CPT | Performed by: EMERGENCY MEDICINE

## 2024-01-20 PROCEDURE — 84484 ASSAY OF TROPONIN QUANT: CPT | Performed by: EMERGENCY MEDICINE

## 2024-01-20 PROCEDURE — 86900 BLOOD TYPING SEROLOGIC ABO: CPT

## 2024-01-20 RX ORDER — CYCLOSPORINE 100 MG/1
2 CAPSULE, LIQUID FILLED ORAL EVERY 12 HOURS
COMMUNITY
Start: 2023-11-28

## 2024-01-20 RX ORDER — ROSUVASTATIN CALCIUM 10 MG/1
TABLET, COATED ORAL
COMMUNITY
Start: 2024-01-10

## 2024-01-20 RX ORDER — AA/PROT/LYSINE/METHIO/VIT C/B6 50-12.5 MG
133 TABLET ORAL 3 TIMES DAILY
COMMUNITY
Start: 2023-08-22

## 2024-01-20 RX ORDER — CEFDINIR 300 MG/1
CAPSULE ORAL
COMMUNITY
Start: 2024-01-18

## 2024-01-20 RX ORDER — CYCLOSPORINE 100 MG/1
CAPSULE, GELATIN COATED ORAL
COMMUNITY
End: 2024-01-22 | Stop reason: HOSPADM

## 2024-01-20 RX ORDER — VITAMIN A 10000 UNIT
TABLET ORAL
COMMUNITY

## 2024-01-20 RX ORDER — AMLODIPINE BESYLATE 10 MG/1
1 TABLET ORAL DAILY
COMMUNITY
Start: 2023-10-23 | End: 2024-01-22 | Stop reason: HOSPADM

## 2024-01-20 RX ORDER — CARVEDILOL 6.25 MG/1
TABLET ORAL
COMMUNITY
Start: 2023-10-31

## 2024-01-20 RX ORDER — HYDROCODONE BITARTRATE AND ACETAMINOPHEN 5; 325 MG/1; MG/1
TABLET ORAL
COMMUNITY
Start: 2024-01-18

## 2024-01-20 RX ORDER — OMEPRAZOLE 40 MG/1
1 CAPSULE, DELAYED RELEASE ORAL DAILY
COMMUNITY
Start: 2023-11-16

## 2024-01-20 RX ORDER — CETIRIZINE HYDROCHLORIDE 10 MG/1
1 TABLET ORAL DAILY
COMMUNITY
End: 2024-01-22 | Stop reason: HOSPADM

## 2024-01-20 RX ORDER — AMLODIPINE BESYLATE 5 MG/1
TABLET ORAL
COMMUNITY
Start: 2023-10-27

## 2024-01-20 RX ORDER — TRAZODONE HYDROCHLORIDE 50 MG/1
.5-1 TABLET ORAL
COMMUNITY

## 2024-01-20 RX ORDER — SODIUM CHLORIDE 0.9 % (FLUSH) 0.9 %
10 SYRINGE (ML) INJECTION AS NEEDED
Status: DISCONTINUED | OUTPATIENT
Start: 2024-01-20 | End: 2024-01-21 | Stop reason: SDUPTHER

## 2024-01-20 RX ORDER — BENZONATATE 200 MG/1
CAPSULE ORAL
COMMUNITY
Start: 2024-01-18

## 2024-01-20 NOTE — Clinical Note
Level of Care: Telemetry [5]   Diagnosis: Thrombocytopenia [886813]   Admitting Physician: SARA WRIGHT III [[id number]]   Attending Physician: SARA WRIGHT III [[id number]]   Bed Request Comments: tele obs (not CDU)

## 2024-01-21 PROBLEM — D64.9 SYMPTOMATIC ANEMIA: Status: ACTIVE | Noted: 2024-01-21

## 2024-01-21 LAB
ABO GROUP BLD: NORMAL
ABO GROUP BLD: NORMAL
ALBUMIN SERPL-MCNC: 3.2 G/DL (ref 3.5–5.2)
ALBUMIN/GLOB SERPL: 1.1 G/DL
ALP SERPL-CCNC: 83 U/L (ref 39–117)
ALT SERPL W P-5'-P-CCNC: 6 U/L (ref 1–33)
ANION GAP SERPL CALCULATED.3IONS-SCNC: 12 MMOL/L (ref 5–15)
AST SERPL-CCNC: 11 U/L (ref 1–32)
BASOPHILS # BLD AUTO: 0.01 10*3/MM3 (ref 0–0.2)
BASOPHILS NFR BLD AUTO: 0.2 % (ref 0–1.5)
BILIRUB SERPL-MCNC: 0.6 MG/DL (ref 0–1.2)
BLD GP AB SCN SERPL QL: NEGATIVE
BUN SERPL-MCNC: 28 MG/DL (ref 8–23)
BUN/CREAT SERPL: 16.8 (ref 7–25)
CALCIUM SPEC-SCNC: 8.8 MG/DL (ref 8.6–10.5)
CHLORIDE SERPL-SCNC: 100 MMOL/L (ref 98–107)
CO2 SERPL-SCNC: 21 MMOL/L (ref 22–29)
CREAT SERPL-MCNC: 1.67 MG/DL (ref 0.57–1)
CREAT UR-MCNC: 180.7 MG/DL
DEPRECATED RDW RBC AUTO: 62.7 FL (ref 37–54)
EGFRCR SERPLBLD CKD-EPI 2021: 31.6 ML/MIN/1.73
EOSINOPHIL # BLD AUTO: 0.04 10*3/MM3 (ref 0–0.4)
EOSINOPHIL NFR BLD AUTO: 0.8 % (ref 0.3–6.2)
ERYTHROCYTE [DISTWIDTH] IN BLOOD BY AUTOMATED COUNT: 15.1 % (ref 12.3–15.4)
GLOBULIN UR ELPH-MCNC: 3 GM/DL
GLUCOSE SERPL-MCNC: 112 MG/DL (ref 65–99)
HCT VFR BLD AUTO: 20.6 % (ref 34–46.6)
HCT VFR BLD AUTO: 22.1 % (ref 34–46.6)
HCT VFR BLD AUTO: 22.9 % (ref 34–46.6)
HCT VFR BLD AUTO: 25.2 % (ref 34–46.6)
HGB BLD-MCNC: 6.8 G/DL (ref 12–15.9)
HGB BLD-MCNC: 7.1 G/DL (ref 12–15.9)
HGB BLD-MCNC: 7.8 G/DL (ref 12–15.9)
HGB BLD-MCNC: 8.2 G/DL (ref 12–15.9)
HOLD SPECIMEN: NORMAL
IMM GRANULOCYTES # BLD AUTO: 0.03 10*3/MM3 (ref 0–0.05)
IMM GRANULOCYTES NFR BLD AUTO: 0.6 % (ref 0–0.5)
INR PPP: 1.13 (ref 0.89–1.12)
LYMPHOCYTES # BLD AUTO: 1.97 10*3/MM3 (ref 0.7–3.1)
LYMPHOCYTES NFR BLD AUTO: 38.6 % (ref 19.6–45.3)
MAGNESIUM SERPL-MCNC: 1.6 MG/DL (ref 1.6–2.4)
MCH RBC QN AUTO: 36.8 PG (ref 26.6–33)
MCHC RBC AUTO-ENTMCNC: 32.1 G/DL (ref 31.5–35.7)
MCV RBC AUTO: 114.5 FL (ref 79–97)
MONOCYTES # BLD AUTO: 0.46 10*3/MM3 (ref 0.1–0.9)
MONOCYTES NFR BLD AUTO: 9 % (ref 5–12)
NEUTROPHILS NFR BLD AUTO: 2.6 10*3/MM3 (ref 1.7–7)
NEUTROPHILS NFR BLD AUTO: 50.8 % (ref 42.7–76)
NRBC BLD AUTO-RTO: 0 /100 WBC (ref 0–0.2)
PLATELET # BLD AUTO: 23 10*3/MM3 (ref 140–450)
PMV BLD AUTO: 11.1 FL (ref 6–12)
POTASSIUM SERPL-SCNC: 4.2 MMOL/L (ref 3.5–5.2)
PROT SERPL-MCNC: 6.2 G/DL (ref 6–8.5)
PROTHROMBIN TIME: 14.7 SECONDS (ref 12.2–14.5)
RBC # BLD AUTO: 1.93 10*6/MM3 (ref 3.77–5.28)
RH BLD: POSITIVE
RH BLD: POSITIVE
SODIUM SERPL-SCNC: 133 MMOL/L (ref 136–145)
SODIUM UR-SCNC: <20 MMOL/L
T&S EXPIRATION DATE: NORMAL
UUN 24H UR-MCNC: 443 MG/DL
WBC NRBC COR # BLD AUTO: 5.11 10*3/MM3 (ref 3.4–10.8)

## 2024-01-21 PROCEDURE — 94799 UNLISTED PULMONARY SVC/PX: CPT

## 2024-01-21 PROCEDURE — 85610 PROTHROMBIN TIME: CPT | Performed by: INTERNAL MEDICINE

## 2024-01-21 PROCEDURE — 99223 1ST HOSP IP/OBS HIGH 75: CPT | Performed by: INTERNAL MEDICINE

## 2024-01-21 PROCEDURE — 85025 COMPLETE CBC W/AUTO DIFF WBC: CPT | Performed by: INTERNAL MEDICINE

## 2024-01-21 PROCEDURE — 99222 1ST HOSP IP/OBS MODERATE 55: CPT | Performed by: INTERNAL MEDICINE

## 2024-01-21 PROCEDURE — 25810000003 SODIUM CHLORIDE 0.9 % SOLUTION: Performed by: INTERNAL MEDICINE

## 2024-01-21 PROCEDURE — 96361 HYDRATE IV INFUSION ADD-ON: CPT

## 2024-01-21 PROCEDURE — 80053 COMPREHEN METABOLIC PANEL: CPT | Performed by: INTERNAL MEDICINE

## 2024-01-21 PROCEDURE — 96360 HYDRATION IV INFUSION INIT: CPT

## 2024-01-21 PROCEDURE — 84540 ASSAY OF URINE/UREA-N: CPT | Performed by: INTERNAL MEDICINE

## 2024-01-21 PROCEDURE — 82570 ASSAY OF URINE CREATININE: CPT | Performed by: INTERNAL MEDICINE

## 2024-01-21 PROCEDURE — 84300 ASSAY OF URINE SODIUM: CPT | Performed by: INTERNAL MEDICINE

## 2024-01-21 PROCEDURE — 36430 TRANSFUSION BLD/BLD COMPNT: CPT

## 2024-01-21 PROCEDURE — G0378 HOSPITAL OBSERVATION PER HR: HCPCS

## 2024-01-21 PROCEDURE — 86850 RBC ANTIBODY SCREEN: CPT | Performed by: INTERNAL MEDICINE

## 2024-01-21 PROCEDURE — 85018 HEMOGLOBIN: CPT | Performed by: INTERNAL MEDICINE

## 2024-01-21 PROCEDURE — 86900 BLOOD TYPING SEROLOGIC ABO: CPT | Performed by: INTERNAL MEDICINE

## 2024-01-21 PROCEDURE — 83735 ASSAY OF MAGNESIUM: CPT | Performed by: INTERNAL MEDICINE

## 2024-01-21 PROCEDURE — 86900 BLOOD TYPING SEROLOGIC ABO: CPT

## 2024-01-21 PROCEDURE — 86901 BLOOD TYPING SEROLOGIC RH(D): CPT | Performed by: INTERNAL MEDICINE

## 2024-01-21 PROCEDURE — 63710000001 CYCLOSPORINE PER 100 MG: Performed by: INTERNAL MEDICINE

## 2024-01-21 PROCEDURE — 86923 COMPATIBILITY TEST ELECTRIC: CPT

## 2024-01-21 PROCEDURE — 94640 AIRWAY INHALATION TREATMENT: CPT

## 2024-01-21 PROCEDURE — 85014 HEMATOCRIT: CPT | Performed by: INTERNAL MEDICINE

## 2024-01-21 PROCEDURE — P9016 RBC LEUKOCYTES REDUCED: HCPCS

## 2024-01-21 RX ORDER — SODIUM CHLORIDE 0.9 % (FLUSH) 0.9 %
10 SYRINGE (ML) INJECTION EVERY 12 HOURS SCHEDULED
Status: DISCONTINUED | OUTPATIENT
Start: 2024-01-21 | End: 2024-01-22 | Stop reason: HOSPADM

## 2024-01-21 RX ORDER — ONDANSETRON 2 MG/ML
4 INJECTION INTRAMUSCULAR; INTRAVENOUS EVERY 6 HOURS PRN
Status: DISCONTINUED | OUTPATIENT
Start: 2024-01-21 | End: 2024-01-22 | Stop reason: HOSPADM

## 2024-01-21 RX ORDER — BENZONATATE 100 MG/1
100 CAPSULE ORAL 3 TIMES DAILY PRN
Status: DISCONTINUED | OUTPATIENT
Start: 2024-01-21 | End: 2024-01-22 | Stop reason: HOSPADM

## 2024-01-21 RX ORDER — BENZONATATE 100 MG/1
100 CAPSULE ORAL 3 TIMES DAILY PRN
Status: DISCONTINUED | OUTPATIENT
Start: 2024-01-21 | End: 2024-01-21

## 2024-01-21 RX ORDER — CARVEDILOL 6.25 MG/1
6.25 TABLET ORAL 2 TIMES DAILY WITH MEALS
Status: DISCONTINUED | OUTPATIENT
Start: 2024-01-21 | End: 2024-01-22 | Stop reason: HOSPADM

## 2024-01-21 RX ORDER — ROSUVASTATIN CALCIUM 10 MG/1
10 TABLET, COATED ORAL DAILY
Status: DISCONTINUED | OUTPATIENT
Start: 2024-01-21 | End: 2024-01-22 | Stop reason: HOSPADM

## 2024-01-21 RX ORDER — CYCLOBENZAPRINE HCL 10 MG
10 TABLET ORAL 3 TIMES DAILY PRN
Status: DISCONTINUED | OUTPATIENT
Start: 2024-01-21 | End: 2024-01-22 | Stop reason: HOSPADM

## 2024-01-21 RX ORDER — SODIUM CHLORIDE 9 MG/ML
40 INJECTION, SOLUTION INTRAVENOUS AS NEEDED
Status: DISCONTINUED | OUTPATIENT
Start: 2024-01-21 | End: 2024-01-22 | Stop reason: HOSPADM

## 2024-01-21 RX ORDER — IPRATROPIUM BROMIDE AND ALBUTEROL SULFATE 2.5; .5 MG/3ML; MG/3ML
3 SOLUTION RESPIRATORY (INHALATION) EVERY 6 HOURS PRN
Status: DISCONTINUED | OUTPATIENT
Start: 2024-01-21 | End: 2024-01-22 | Stop reason: HOSPADM

## 2024-01-21 RX ORDER — SODIUM CHLORIDE 0.9 % (FLUSH) 0.9 %
10 SYRINGE (ML) INJECTION AS NEEDED
Status: DISCONTINUED | OUTPATIENT
Start: 2024-01-21 | End: 2024-01-22 | Stop reason: HOSPADM

## 2024-01-21 RX ORDER — CHOLECALCIFEROL (VITAMIN D3) 125 MCG
5 CAPSULE ORAL NIGHTLY PRN
Status: DISCONTINUED | OUTPATIENT
Start: 2024-01-21 | End: 2024-01-22 | Stop reason: HOSPADM

## 2024-01-21 RX ORDER — ACETAMINOPHEN 325 MG/1
650 TABLET ORAL EVERY 4 HOURS PRN
Status: DISCONTINUED | OUTPATIENT
Start: 2024-01-21 | End: 2024-01-22 | Stop reason: HOSPADM

## 2024-01-21 RX ORDER — HYDROCODONE BITARTRATE AND ACETAMINOPHEN 5; 325 MG/1; MG/1
1 TABLET ORAL EVERY 4 HOURS PRN
Status: DISCONTINUED | OUTPATIENT
Start: 2024-01-21 | End: 2024-01-22 | Stop reason: HOSPADM

## 2024-01-21 RX ORDER — PANTOPRAZOLE SODIUM 40 MG/1
40 TABLET, DELAYED RELEASE ORAL
Status: DISCONTINUED | OUTPATIENT
Start: 2024-01-21 | End: 2024-01-22 | Stop reason: HOSPADM

## 2024-01-21 RX ORDER — CYCLOSPORINE 100 MG/1
200 CAPSULE, GELATIN COATED ORAL 2 TIMES DAILY
Status: DISCONTINUED | OUTPATIENT
Start: 2024-01-21 | End: 2024-01-22 | Stop reason: HOSPADM

## 2024-01-21 RX ORDER — NITROGLYCERIN 0.4 MG/1
0.4 TABLET SUBLINGUAL
Status: DISCONTINUED | OUTPATIENT
Start: 2024-01-21 | End: 2024-01-22 | Stop reason: HOSPADM

## 2024-01-21 RX ORDER — TRAZODONE HYDROCHLORIDE 50 MG/1
50 TABLET ORAL NIGHTLY PRN
Status: DISCONTINUED | OUTPATIENT
Start: 2024-01-21 | End: 2024-01-22 | Stop reason: HOSPADM

## 2024-01-21 RX ORDER — MONTELUKAST SODIUM 10 MG/1
10 TABLET ORAL DAILY
Status: DISCONTINUED | OUTPATIENT
Start: 2024-01-21 | End: 2024-01-22 | Stop reason: HOSPADM

## 2024-01-21 RX ORDER — SODIUM CHLORIDE 9 MG/ML
100 INJECTION, SOLUTION INTRAVENOUS CONTINUOUS
Status: DISCONTINUED | OUTPATIENT
Start: 2024-01-21 | End: 2024-01-22

## 2024-01-21 RX ORDER — CEFDINIR 300 MG/1
300 CAPSULE ORAL EVERY 12 HOURS SCHEDULED
Status: DISCONTINUED | OUTPATIENT
Start: 2024-01-21 | End: 2024-01-22 | Stop reason: HOSPADM

## 2024-01-21 RX ORDER — AMLODIPINE BESYLATE 10 MG/1
10 TABLET ORAL DAILY
Status: DISCONTINUED | OUTPATIENT
Start: 2024-01-21 | End: 2024-01-22 | Stop reason: HOSPADM

## 2024-01-21 RX ADMIN — CYCLOSPORINE 200 MG: 100 CAPSULE, LIQUID FILLED ORAL at 08:56

## 2024-01-21 RX ADMIN — ROSUVASTATIN 10 MG: 10 TABLET, FILM COATED ORAL at 08:55

## 2024-01-21 RX ADMIN — BENZONATATE 100 MG: 100 CAPSULE ORAL at 14:02

## 2024-01-21 RX ADMIN — TRAZODONE HYDROCHLORIDE 50 MG: 50 TABLET ORAL at 22:40

## 2024-01-21 RX ADMIN — Medication 10 ML: at 01:23

## 2024-01-21 RX ADMIN — CEFDINIR 300 MG: 300 CAPSULE ORAL at 20:03

## 2024-01-21 RX ADMIN — MONTELUKAST 10 MG: 10 TABLET, FILM COATED ORAL at 20:03

## 2024-01-21 RX ADMIN — CARVEDILOL 6.25 MG: 6.25 TABLET, FILM COATED ORAL at 18:11

## 2024-01-21 RX ADMIN — CYCLOSPORINE 200 MG: 100 CAPSULE, LIQUID FILLED ORAL at 20:03

## 2024-01-21 RX ADMIN — SODIUM CHLORIDE 100 ML/HR: 9 INJECTION, SOLUTION INTRAVENOUS at 01:19

## 2024-01-21 RX ADMIN — IPRATROPIUM BROMIDE AND ALBUTEROL SULFATE 3 ML: 2.5; .5 SOLUTION RESPIRATORY (INHALATION) at 19:21

## 2024-01-21 RX ADMIN — PANTOPRAZOLE SODIUM 40 MG: 40 TABLET, DELAYED RELEASE ORAL at 05:09

## 2024-01-21 RX ADMIN — CEFDINIR 300 MG: 300 CAPSULE ORAL at 08:55

## 2024-01-21 RX ADMIN — Medication 10 ML: at 09:46

## 2024-01-21 RX ADMIN — Medication 10 ML: at 20:07

## 2024-01-21 NOTE — ED NOTES
Blanca Sparrow    Nursing Report ED to Floor:  Mental status: a/o x4  Ambulatory status: ambulatory  Oxygen Therapy: room air  Cardiac Rhythm: nsr  Admitted from: home  Safety Concerns:  none  Social Issues: none  ED Room #:  14    ED Nurse Phone Extension - [phone] or may call [phone].      HPI:   Chief Complaint   Patient presents with    Shortness of Breath       Past Medical History:  Past Medical History:   Diagnosis Date    Aortic aneurysm, abdominal     Arthritis     GERD (gastroesophageal reflux disease)     High cholesterol     Hypertension         Past Surgical History:  Past Surgical History:   Procedure Laterality Date    ACHILLES TENDON SURGERY      APPENDECTOMY      BREAST BIOPSY Right     BENIGN CORE BX, PT UNSURE OF DATE    HYSTERECTOMY      POST MENOPAUSAL AGE 60-65    OOPHORECTOMY Bilateral     POST MENOPAUSAL AGE 60-65    OTHER SURGICAL HISTORY      APPEY    SKIN BIOPSY Right 08/2021    Upper Arm    SKIN CANCER EXCISION      TONSILLECTOMY      TOTAL HIP ARTHROPLASTY REVISION Right     TOTAL KNEE ARTHROPLASTY Left         Admitting Doctor:   Paresh Voss III, DO    Consulting Provider(s):  Consults       Date and Time Order Name Status Description    1/21/2024 12:43 AM Inpatient Hematology & Oncology Consult               Admitting Diagnosis:   The primary encounter diagnosis was Thrombocytopenia. Diagnoses of Acute anemia, Generalized weakness, Epistaxis, and Acute kidney injury were also pertinent to this visit.    Most Recent Vitals:   Vitals:    01/20/24 2357 01/21/24 0000 01/21/24 0029 01/21/24 0030   BP: 123/71 107/67  130/67   BP Location:       Patient Position:       Pulse: 70 70 75 72   Resp:       Temp:       TempSrc:       SpO2: 98% 99%  94%   Weight:       Height:           Active LDAs/IV Access:   Lines, Drains & Airways       Active LDAs       Name Placement date Placement time Site Days    Peripheral IV 01/20/24 2239 Right Antecubital 01/20/24 2239  Antecubital  less than 1                     Labs (abnormal labs have a star):   Labs Reviewed   COMPREHENSIVE METABOLIC PANEL - Abnormal; Notable for the following components:       Result Value    Glucose 134 (*)     BUN 28 (*)     Creatinine 1.73 (*)     Sodium 132 (*)     Chloride 97 (*)     eGFR 30.3 (*)     All other components within normal limits    Narrative:     GFR Normal >60  Chronic Kidney Disease <60  Kidney Failure <15    The GFR formula is only valid for adults with stable renal function between ages 18 and 70.   SINGLE HSTROPONIN T - Abnormal; Notable for the following components:    HS Troponin T 23 (*)     All other components within normal limits    Narrative:     High Sensitive Troponin T Reference Range:  <14.0 ng/L- Negative Female for AMI  <22.0 ng/L- Negative Male for AMI  >=14 - Abnormal Female indicating possible myocardial injury.  >=22 - Abnormal Male indicating possible myocardial injury.   Clinicians would have to utilize clinical acumen, EKG, Troponin, and serial changes to determine if it is an Acute Myocardial Infarction or myocardial injury due to an underlying chronic condition.        CBC WITH AUTO DIFFERENTIAL - Abnormal; Notable for the following components:    RBC 2.11 (*)     Hemoglobin 7.8 (*)     Hematocrit 23.7 (*)     .3 (*)     MCH 37.0 (*)     RDW-SD 62.3 (*)     MPV 12.2 (*)     Platelets 24 (*)     All other components within normal limits    Narrative:     Appended report. These results have been appended to a previously verified report.   COVID-19 AND FLU A/B, NP SWAB IN TRANSPORT MEDIA 1 HR TAT - Normal    Narrative:     Fact sheet for providers: https://www.fda.gov/media/109141/download    Fact sheet for patients: https://www.fda.gov/media/401534/download    Test performed by PCR.   BNP (IN-HOUSE) - Normal    Narrative:     This assay is used as an aid in the diagnosis of individuals suspected of having heart failure. It can be used as an aid in the diagnosis of acute decompensated  "heart failure (ADHF) in patients presenting with signs and symptoms of ADHF to the emergency department (ED). In addition, NT-proBNP of <300 pg/mL indicates ADHF is not likely.    Age Range Result Interpretation  NT-proBNP Concentration (pg/mL:      <50             Positive            >450                   Gray                 300-450                    Negative             <300    50-75           Positive            >900                  Gray                300-900                  Negative            <300      >75             Positive            >1800                  Gray                300-1800                  Negative            <300   D-DIMER, QUANTITATIVE - Normal    Narrative:     According to the assay 's published package insert, a normal (<0.50 MCGFEU/mL) D-dimer result in conjunction with a non-high clinical probability assessment, excludes deep vein thrombosis (DVT) and pulmonary embolism (PE) with high sensitivity.    D-dimer values increase with age and this can make VTE exclusion of an older population difficult. To address this, the [employer], based on best available evidence and recent guidelines, recommends that clinicians use age-adjusted D-dimer thresholds in patients greater than 50 years of age with: a) a low probability of PE who do not meet all Pulmonary Embolism Rule Out Criteria, or b) in those with intermediate probability of PE.   The formula for an age-adjusted D-dimer cut-off is \"age/100\".  For example, a 60 year old patient would have an age-adjusted cut-off of 0.60 MCGFEU/mL and an 80 year old 0.80 MCGFEU/mL.   COVID PRE-OP / PRE-PROCEDURE SCREENING ORDER (NO ISOLATION)    Narrative:     The following orders were created for panel order COVID PRE-OP / PRE-PROCEDURE SCREENING ORDER (NO ISOLATION) - Swab, Nasopharynx.  Procedure                               Abnormality         Status                     ---------                               " -----------         ------                     COVID-19 and FLU A/B PCR...[[id number]]  Normal              Final result                 Please view results for these tests on the individual orders.   SCAN SLIDE   RAINBOW DRAW    Narrative:     The following orders were created for panel order [place] Draw.  Procedure                               Abnormality         Status                     ---------                               -----------         ------                     Green Top (Gel)[[id number]]                                  Final result               Lavender Top[[id number]]                                     Final result               Gold Top - SST[[id number]]                                   Final result               Tse Top[[id number]]                                         In process                 Light Blue Top[[id number]]                                   Final result                 Please view results for these tests on the individual orders.   HEMOGLOBIN AND HEMATOCRIT, BLOOD   COMPREHENSIVE METABOLIC PANEL   CBC WITH AUTO DIFFERENTIAL   MAGNESIUM   PROTIME-INR   CREATININE URINE RANDOM (KIDNEY FUNCTION) GFR COMPONENT   UREA NITROGEN, URINE   SODIUM, URINE, RANDOM   TYPE AND SCREEN   CBC AND DIFFERENTIAL    Narrative:     The following orders were created for panel order CBC & Differential.  Procedure                               Abnormality         Status                     ---------                               -----------         ------                     CBC Auto Differential[[id number]]        Abnormal            Final result               Scan Slide[[id number]]                                       Final result                 Please view results for these tests on the individual orders.   GREEN TOP   LAVENDER TOP   GOLD TOP - SST   LIGHT BLUE TOP   GRAY TOP       Meds Given in ED:   Medications   sodium chloride 0.9 % flush 10 mL (has no administration in time range)   sodium  chloride 0.9 % flush 10 mL (10 mL Intravenous Given 1/21/24 0123)   sodium chloride 0.9 % flush 10 mL (has no administration in time range)   sodium chloride 0.9 % infusion 40 mL (has no administration in time range)   nitroglycerin (NITROSTAT) SL tablet 0.4 mg (has no administration in time range)   sodium chloride 0.9 % infusion (100 mL/hr Intravenous New Bag 1/21/24 0119)   Potassium Replacement - Follow Nurse / BPA Driven Protocol (has no administration in time range)   Magnesium Standard Dose Replacement - Follow Nurse / BPA Driven Protocol (has no administration in time range)   Phosphorus Replacement - Follow Nurse / BPA Driven Protocol (has no administration in time range)   Calcium Replacement - Follow Nurse / BPA Driven Protocol (has no administration in time range)   acetaminophen (TYLENOL) tablet 650 mg (has no administration in time range)   HYDROcodone-acetaminophen (NORCO) 5-325 MG per tablet 1 tablet (has no administration in time range)   melatonin tablet 5 mg (has no administration in time range)   ondansetron (ZOFRAN) injection 4 mg (has no administration in time range)   ipratropium-albuterol (DUO-NEB) nebulizer solution 3 mL (has no administration in time range)     sodium chloride, 100 mL/hr, Last Rate: 100 mL/hr (01/21/24 0119)

## 2024-01-21 NOTE — CONSULTS
HEMATOLOGY/ONCOLOGY INPATIENT CONSULTATION      REFERRING PHYSICIAN: Mckenna HALL MD    PRIMARY CARE PROVIDER: Josiah Wagner MD    REASON FOR CONSULTATION: Severe anemia and thrombocytopenia      HISTORY OF PRESENT ILLNESS: Ms. Sparrow is a very pleasant 78-year-old male resident of [facility].  Who is visiting the area.  She presents with worsening dyspnea and cough.  She was found to be severely anemic and thrombocytopenic.  She is been seen by my partner Dr. Espinal over a year ago for possible emerging MDS.  Subsequently moved to [place] where she was worked up with 2 bone marrow biopsies for possible marrow failure syndrome.  Currently she is on cyclosporine and Promacta with the thought that she may have underlying aplastic anemia with some ITP.  She has not required platelet transfusions since May 2023.  She has not required any red blood cell transfusion since then either.  Her platelets have been hovering around 50,000 and she has been anemic since then though not transfusion dependent.  She now presents with worsening cough and was placed on antibiotics.  She is having difficulty even speaking to me today but persistent cough that is ongoing.      No Known Allergies    Past Medical History:   Diagnosis Date    Aortic aneurysm, abdominal     Arthritis     GERD (gastroesophageal reflux disease)     High cholesterol     Hypertension          Current Facility-Administered Medications:     acetaminophen (TYLENOL) tablet 650 mg, 650 mg, Oral, Q4H PRN, Paresh Voss III, DO    amLODIPine (NORVASC) tablet 10 mg, 10 mg, Oral, Daily, Paresh Voss III, DO    benzonatate (TESSALON) capsule 100 mg, 100 mg, Oral, TID PRN, Paresh Voss III, DO    Calcium Replacement - Follow Nurse / BPA Driven Protocol, , Does not apply, PRN, Paresh Voss III, DO    carvedilol (COREG) tablet 6.25 mg, 6.25 mg, Oral, BID With Meals, Paresh Voss III, DO    cefdinir  (OMNICEF) capsule 300 mg, 300 mg, Oral, Q12H, Paresh Voss III, DO, 300 mg at 01/21/24 0855    cyclobenzaprine (FLEXERIL) tablet 10 mg, 10 mg, Oral, TID PRN, Paresh Voss III, DO    cycloSPORINE (sandIMMUNE) capsule 200 mg, 200 mg, Oral, BID, Paresh Voss III, DO, 200 mg at 01/21/24 0856    HYDROcodone-acetaminophen (NORCO) 5-325 MG per tablet 1 tablet, 1 tablet, Oral, Q4H PRN, Paresh Voss III, DO    ipratropium-albuterol (DUO-NEB) nebulizer solution 3 mL, 3 mL, Nebulization, Q6H PRN, Paresh Voss III, DO    losartan (COZAAR) tablet 75 mg, 75 mg, Oral, Q24H, Paresh Voss III, DO    Magnesium Standard Dose Replacement - Follow Nurse / BPA Driven Protocol, , Does not apply, PRN, Paresh Voss III, DO    melatonin tablet 5 mg, 5 mg, Oral, Nightly PRN, Paresh Voss III, DO    montelukast (SINGULAIR) tablet 10 mg, 10 mg, Oral, Daily, Paresh Voss III, DO    nitroglycerin (NITROSTAT) SL tablet 0.4 mg, 0.4 mg, Sublingual, Q5 Min PRN, Praesh Voss III, DO    ondansetron (ZOFRAN) injection 4 mg, 4 mg, Intravenous, Q6H PRN, Paresh Voss III, DO    pantoprazole (PROTONIX) EC tablet 40 mg, 40 mg, Oral, Q AM, Paresh Voss III, DO, 40 mg at 01/21/24 0509    Phosphorus Replacement - Follow Nurse / BPA Driven Protocol, , Does not apply, PRN, Paresh Voss III, DO    Potassium Replacement - Follow Nurse / BPA Driven Protocol, , Does not apply, PRN, Paresh Voss III, DO    rosuvastatin (CRESTOR) tablet 10 mg, 10 mg, Oral, Daily, Paresh Voss III, DO, 10 mg at 01/21/24 0855    sodium chloride 0.9 % flush 10 mL, 10 mL, Intravenous, Q12H, Paresh Voss III, DO, 10 mL at 01/21/24 0946    sodium chloride 0.9 % flush 10 mL, 10 mL, Intravenous, PRN, Paresh Voss III, DO    sodium chloride 0.9 % infusion 40 mL, 40 mL, Intravenous, PRN, Paresh Voss  III, DO    sodium chloride 0.9 % infusion, 100 mL/hr, Intravenous, Continuous, Paresh Voss III, DO, Last Rate: 100 mL/hr at 24 0644, 100 mL/hr at 24 0644    traZODone (DESYREL) tablet 50 mg, 50 mg, Oral, Nightly PRN, Paresh Voss III, DO    Past Surgical History:   Procedure Laterality Date    ACHILLES TENDON SURGERY      APPENDECTOMY      BREAST BIOPSY Right     BENIGN CORE BX, PT UNSURE OF DATE    HYSTERECTOMY      POST MENOPAUSAL AGE 60-65    OOPHORECTOMY Bilateral     POST MENOPAUSAL AGE 60-65    OTHER SURGICAL HISTORY      APPEY    SKIN BIOPSY Right 2021    Upper Arm    SKIN CANCER EXCISION      TONSILLECTOMY      TOTAL HIP ARTHROPLASTY REVISION Right     TOTAL KNEE ARTHROPLASTY Left        Social History     Socioeconomic History    Marital status:     Number of children: 1   Tobacco Use    Smoking status: Former     Packs/day: 1.00     Years: 13.00     Additional pack years: 0.00     Total pack years: 13.00     Types: Cigarettes     Start date:      Quit date:      Years since quittin.0    Smokeless tobacco: Never    Tobacco comments:     Pt used nicoret since quit smoking    Vaping Use    Vaping Use: Never used   Substance and Sexual Activity    Alcohol use: No    Drug use: No    Sexual activity: Never       Family History   Problem Relation Age of Onset    Heart disease Mother     Osteoarthritis Mother     Breast cancer Mother 50    Heart disease Father     Diabetes Father     Stroke Father     Breast cancer Sister 38    Ovarian cancer Maternal Grandmother         DX AGE UNKNOWN    Endometrial cancer Neg Hx        Oncology/Hematology History    No history exists.         REVIEW OF SYSTEMS:  A 14 point review of systems was performed and is negative except as noted below.    Review of Systems   Constitutional:  Positive for fatigue.   HENT:  Negative.     Respiratory:  Positive for cough and shortness of breath.    Cardiovascular: Negative.     Gastrointestinal: Negative.    Endocrine: Negative.    Musculoskeletal: Negative.    Skin: Negative.    Neurological: Negative.    Hematological: Negative.    Psychiatric/Behavioral: Negative.           Objective     Vitals:    01/21/24 0959 01/21/24 1013 01/21/24 1100 01/21/24 1116   BP: 115/56 115/65  121/60   BP Location: Left arm Left arm  Left arm   Patient Position: Lying Lying  Lying   Pulse: 81 80  74   Resp: 16 18  18   Temp: 98.1 °F (36.7 °C) 98.3 °F (36.8 °C)  98 °F (36.7 °C)   TempSrc: Oral Oral Oral Oral   SpO2: 95% 95%  94%   Weight:       Height:                       Temp:  [97.5 °F (36.4 °C)-98.3 °F (36.8 °C)] 98 °F (36.7 °C)     Performance Status: 1    Physical Exam    General: well appearing female coughing the whole time I was speaking to her.   HEENT: sclerae anicteric,   Lymphatics: no cervical, supraclavicular, or axillary adenopathy  Abdomen: soft, nontender, nondistended.  No palpable organomegaly  Extremities: no lower extremity edema  Skin: no rashes, lesions, bruising, or petechiae      LABS:    Lab Results   Component Value Date    HGB 7.8 (L) 01/21/2024    HCT 22.9 (L) 01/21/2024    .5 (H) 01/21/2024    PLT 23 (C) 01/21/2024    WBC 5.11 01/21/2024    NEUTROABS 2.60 01/21/2024    LYMPHSABS 1.97 01/21/2024    MONOSABS 0.46 01/21/2024    EOSABS 0.04 01/21/2024    BASOSABS 0.01 01/21/2024     Lab Results   Component Value Date    GLUCOSE 112 (H) 01/21/2024    BUN 28 (H) 01/21/2024    CREATININE 1.67 (H) 01/21/2024     (L) 01/21/2024    K 4.2 01/21/2024     01/21/2024    CO2 21.0 (L) 01/21/2024    CALCIUM 8.8 01/21/2024    PROTEINTOT 6.2 01/21/2024    ALBUMIN 3.2 (L) 01/21/2024    BILITOT 0.6 01/21/2024    ALKPHOS 83 01/21/2024    AST 11 01/21/2024    ALT 6 01/21/2024     Lab Results   Component Value Date    HGB 7.8 (L) 01/21/2024    HGB 6.8 (C) 01/21/2024    HGB 7.1 (L) 01/21/2024     Lab Results   Component Value Date    FERRITIN 341 (H) 10/24/2023    FERRITIN 176  10/18/2022    FERRITIN 59.83 07/27/2022     Lab Results   Component Value Date    .5 (H) 01/21/2024    .3 (H) 01/20/2024     (H) 01/09/2024     Lab Results   Component Value Date    TIBC 286 10/24/2023    TIBC 287 10/18/2022    TIBC 431 07/27/2022     Lab Results   Component Value Date    LABIRON 97 (H) 10/24/2023    LABIRON 48 (H) 10/18/2022    LABIRON 20 07/27/2022     Lab Results   Component Value Date    IRON 278 (H) 10/24/2023    IRON 138 10/18/2022    IRON 86 07/27/2022     Lab Results   Component Value Date    PLT 23 (C) 01/21/2024    PLT 24 (C) 01/20/2024    PLT 36 (L) 01/09/2024    PLT 32 (L) 01/02/2024    PLT 31 (L) 12/27/2023           IMAGING    XR Chest 1 View    Result Date: 1/20/2024  XR CHEST 1 VW Date of Exam: 1/20/2024 10:20 PM EST Indication: SOA triage protocol Comparison: CT chest 1/16/2023. Chest radiograph 3/9/2018. Findings: There is stable scarring in the right lung base. Left lung is clear. No new lung opacity. No pneumothorax or pleural effusion. Heart size and pulmonary vasculature appear within normal limits and there are no acute osseous abnormalities.     Impression: Stable right basilar scarring. No acute cardiopulmonary abnormality. Electronically Signed: Ryan Callejas MD  1/20/2024 10:39 PM EST  Workstation ID: [id number]      ASSESSMENT/PLAN:    1.  Severe anemia and thrombocytopenia.  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  She has had minimal response to cyclosporine.  Though the Promacta has kept her from requiring transfusion support.  For now recommend transfuse her to get her hemoglobin above 8 at least.  And then continue to transfuse platelets if platelets are less than 10.  If she remains stable she can be discharged.  She is returning to [place] on Saturday.  She is seen by the [facility] [place] every 2 weeks with lab checks.    2.  Severe bronchitis with  persistent productive sounding cough.  Will place her on a flutter valve to see if it helps.  Will get sputum cultures.        Jorge Adame MD    1/21/2024

## 2024-01-21 NOTE — ED PROVIDER NOTES
[facility]    EMERGENCY DEPARTMENT ENCOUNTER      Pt Name: Blanca Sparrow  MRN: [id number]  YOB: 1945  Date of evaluation: 1/20/2024  Provider: Ganesh Gordon MD    CHIEF COMPLAINT       Chief Complaint   Patient presents with    Shortness of Breath         HISTORY OF PRESENT ILLNESS   Blanca Sparrow is a 78 y.o. female who presents to the emergency department with complaint of progressive fatigue and dyspnea on exertion over the course of the past couple of weeks.  Patient has also had productive cough but no chest pain or discomfort, abdominal pain, vomiting, or diarrhea.  She does report she had 3 spontaneous heavy nosebleeds yesterday.  She has history of low-grade myelodysplastic syndrome with chronic anemia and thrombocytopenia and has frequent laboratory checks for these values.  She has noticed no blood in her stool or urine.      Nursing notes were reviewed.    REVIEW OF SYSTEMS     ROS:  A chief complaint appropriate review of systems was completed and is negative except as noted in the HPI.      PAST MEDICAL HISTORY     Past Medical History:   Diagnosis Date    Aortic aneurysm, abdominal     Arthritis     GERD (gastroesophageal reflux disease)     High cholesterol     Hypertension          SURGICAL HISTORY       Past Surgical History:   Procedure Laterality Date    ACHILLES TENDON SURGERY      APPENDECTOMY      BREAST BIOPSY Right     BENIGN CORE BX, PT UNSURE OF DATE    HYSTERECTOMY      POST MENOPAUSAL AGE 60-65    OOPHORECTOMY Bilateral     POST MENOPAUSAL AGE 60-65    OTHER SURGICAL HISTORY      APPEY    SKIN BIOPSY Right 08/2021    Upper Arm    SKIN CANCER EXCISION      TONSILLECTOMY      TOTAL HIP ARTHROPLASTY REVISION Right     TOTAL KNEE ARTHROPLASTY Left          CURRENT MEDICATIONS       Current Facility-Administered Medications:     sodium chloride 0.9 % flush 10 mL, 10 mL, Intravenous, PRN, Ganesh Gordon MD    Current Outpatient Medications:     amLODIPine (NORVASC) 10 MG  tablet, Take 1 tablet by mouth Daily., Disp: , Rfl:     amLODIPine (NORVASC) 5 MG tablet, Take one tab po daily, Disp: , Rfl:     benzonatate (TESSALON) 200 MG capsule, Take 1 capsule 3 times a day by oral route as needed., Disp: , Rfl:     carvedilol (COREG) 6.25 MG tablet, Take one tab po twice a day, Disp: , Rfl:     cefdinir (OMNICEF) 300 MG capsule, Take 1 capsule every 12 hours by oral route for 7 days., Disp: , Rfl:     cycloSPORINE modified (NEORAL) 100 MG capsule, Take 2 capsules by mouth Every 12 (Twelve) Hours., Disp: , Rfl:     HYDROcodone-acetaminophen (NORCO) 5-325 MG per tablet, Take 1 tablet every 6 hours by oral route as needed., Disp: , Rfl:     omeprazole (priLOSEC) 40 MG capsule, Take 1 capsule by mouth Daily., Disp: , Rfl:     rosuvastatin (CRESTOR) 10 MG tablet, Take 1 tablet every day by oral route at bedtime for 90 days., Disp: , Rfl:     Specialty Vitamins Products (MG Plus Protein) 133 MG tablet, Take 133 mg by mouth 3 (Three) Times a Day., Disp: , Rfl:     Calcium Carbonate-Vitamin D (CALCIUM + D PO), Take  by mouth daily., Disp: , Rfl:     cetirizine (zyrTEC) 10 MG tablet, Take 10 mg by mouth Daily., Disp: , Rfl:     cetirizine (zyrTEC) 10 MG tablet, Take 1 tablet by mouth Daily., Disp: , Rfl:     Cholecalciferol (VITAMIN D-3 PO), Take 5,000 Units by mouth daily., Disp: , Rfl:     cyclobenzaprine (FLEXERIL) 10 MG tablet, Take 10 mg by mouth 3 (Three) Times a Day As Needed., Disp: , Rfl: 3    cycloSPORINE (sandIMMUNE) 100 MG capsule, Take 2 capsules twice a day by oral route., Disp: , Rfl:     eltrombopag (Promacta) 50 MG tablet, , Disp: , Rfl:     montelukast (SINGULAIR) 10 MG tablet, Take 10 mg by mouth daily., Disp: , Rfl:     niacinamide 100 MG tablet, Take 2 tablets every day by oral route., Disp: , Rfl:     Olmesartan Medoxomil (BENICAR PO), Take 30 mg by mouth Daily., Disp: , Rfl:     omeprazole (PriLOSEC) 40 MG capsule, Take 40 mg by mouth daily., Disp: , Rfl:     pravastatin  "(PRAVACHOL) 40 MG tablet, Take 80 mg by mouth Daily., Disp: , Rfl:     traZODone (DESYREL) 50 MG tablet, trazodone 50 mg tablet, Disp: , Rfl:     traZODone (DESYREL) 50 MG tablet, Take 0.5-1 tablets by mouth every night at bedtime., Disp: , Rfl:     venlafaxine XR (EFFEXOR-XR) 150 MG 24 hr capsule, Take 150 mg by mouth daily., Disp: , Rfl:     vitamin B-12 (CYANOCOBALAMIN) 100 MCG tablet, Vitamin B12  5000 mg po qd, Disp: , Rfl:     vitamin D3 (vitamin d) 125 MCG (5000 UT) capsule capsule, Take 1 capsule by mouth., Disp: , Rfl:     ALLERGIES     Patient has no known allergies.    FAMILY HISTORY       Family History   Problem Relation Age of Onset    Heart disease Mother     Osteoarthritis Mother     Breast cancer Mother 50    Heart disease Father     Diabetes Father     Stroke Father     Breast cancer Sister 38    Ovarian cancer Maternal Grandmother         DX AGE UNKNOWN    Endometrial cancer Neg Hx           SOCIAL HISTORY       Social History     Socioeconomic History    Marital status:     Number of children: 1   Tobacco Use    Smoking status: Former     Packs/day: 1.00     Years: 13.00     Additional pack years: 0.00     Total pack years: 13.00     Types: Cigarettes     Start date:      Quit date:      Years since quittin.0    Smokeless tobacco: Never    Tobacco comments:     Pt used nicoret since quit smoking    Vaping Use    Vaping Use: Never used   Substance and Sexual Activity    Alcohol use: No    Drug use: No    Sexual activity: Never         PHYSICAL EXAM    (up to 7 for level 4, 8 or more for level 5)     Vitals:    24 2150 24 2357 24 0000   BP: 103/72 123/71 107/67   BP Location: Left arm     Patient Position: Sitting     Pulse: 74 70 70   Resp: 16     Temp: 97.5 °F (36.4 °C)     TempSrc: Oral     SpO2: 96% 98% 99%   Weight: 91.2 kg (201 lb)     Height: 170.2 cm (67\")         General: Awake, alert, no acute distress.  HEENT: Conjunctivae normal.  Neck: Trachea " midline.  Cardiac: Heart regular rate, rhythm, no murmurs, rubs, or gallops  Lungs: Lungs are clear to auscultation, there is no wheezing, rhonchi, or rales. There is no use of accessory muscles.  Chest wall: There is no tenderness to palpation over the chest wall or over ribs  Abdomen: Abdomen is soft, nontender, nondistended. There are no firm or pulsatile masses, no rebound rigidity or guarding.   Musculoskeletal: No deformity.  Neuro: Alert and oriented x 4.  Dermatology: Skin is warm and dry  Psych: Mentation is grossly normal, cognition is grossly normal. Affect is appropriate.        DIAGNOSTIC RESULTS     EKG: All EKGs are interpreted by the Emergency Department Physician who either signs or Co-signs this chart in the absence of a cardiologist.    ECG 12 Lead ED Triage Standing Order; SOA   Preliminary Result   Test Reason : ED Triage Standing Order~   Blood Pressure :   */*   mmHG   Vent. Rate :  71 BPM     Atrial Rate :  71 BPM      P-R Int : 176 ms          QRS Dur :  70 ms       QT Int : 366 ms       P-R-T Axes :   1   7  15 degrees      QTc Int : 397 ms      Normal sinus rhythm   Normal ECG   When compared with ECG of 18-OCT-2010 10:48,   Nonspecific T wave abnormality now evident in Inferior leads   T wave amplitude has decreased in Anterolateral leads      Referred By:            Confirmed By:             RADIOLOGY:   [x] Radiologist's Report Reviewed:  XR Chest 1 View   Final Result   Impression:   Stable right basilar scarring. No acute cardiopulmonary abnormality.            Electronically Signed: Ryan Callejas MD     1/20/2024 10:39 PM EST     Workstation ID: [id number]          I ordered and independently reviewed the above noted radiographic studies.        LABS:    I have reviewed and interpreted all of the currently available lab results from this visit (if applicable):  Results for orders placed or performed during the hospital encounter of 01/20/24   COVID-19 and FLU A/B PCR, 1 HR TAT - Swab,  Nasopharynx    Specimen: Nasopharynx; Swab   Result Value Ref Range    COVID19 Not Detected Not Detected - Ref. Range    Influenza A PCR Not Detected Not Detected    Influenza B PCR Not Detected Not Detected   Comprehensive Metabolic Panel    Specimen: Blood   Result Value Ref Range    Glucose 134 (H) 65 - 99 mg/dL    BUN 28 (H) 8 - 23 mg/dL    Creatinine 1.73 (H) 0.57 - 1.00 mg/dL    Sodium 132 (L) 136 - 145 mmol/L    Potassium 4.2 3.5 - 5.2 mmol/L    Chloride 97 (L) 98 - 107 mmol/L    CO2 22.0 22.0 - 29.0 mmol/L    Calcium 9.0 8.6 - 10.5 mg/dL    Total Protein 6.7 6.0 - 8.5 g/dL    Albumin 3.5 3.5 - 5.2 g/dL    ALT (SGPT) 7 1 - 33 U/L    AST (SGOT) 13 1 - 32 U/L    Alkaline Phosphatase 94 39 - 117 U/L    Total Bilirubin 0.7 0.0 - 1.2 mg/dL    Globulin 3.2 gm/dL    A/G Ratio 1.1 g/dL    BUN/Creatinine Ratio 16.2 7.0 - 25.0    Anion Gap 13.0 5.0 - 15.0 mmol/L    eGFR 30.3 (L) >60.0 mL/min/1.73   BNP    Specimen: Blood   Result Value Ref Range    proBNP 1,126.0 0.0 - 1,800.0 pg/mL   Single High Sensitivity Troponin T    Specimen: Blood   Result Value Ref Range    HS Troponin T 23 (H) <14 ng/L   CBC Auto Differential    Specimen: Blood   Result Value Ref Range    WBC 5.57 3.40 - 10.80 10*3/mm3    RBC 2.11 (L) 3.77 - 5.28 10*6/mm3    Hemoglobin 7.8 (L) 12.0 - 15.9 g/dL    Hematocrit 23.7 (L) 34.0 - 46.6 %    .3 (H) 79.0 - 97.0 fL    MCH 37.0 (H) 26.6 - 33.0 pg    MCHC 32.9 31.5 - 35.7 g/dL    RDW 15.0 12.3 - 15.4 %    RDW-SD 62.3 (H) 37.0 - 54.0 fl    MPV 12.2 (H) 6.0 - 12.0 fL    Platelets 24 (C) 140 - 450 10*3/mm3    Neutrophil % 60.1 42.7 - 76.0 %    Lymphocyte % 28.5 19.6 - 45.3 %    Monocyte % 10.1 5.0 - 12.0 %    Eosinophil % 0.9 0.3 - 6.2 %    Basophil % 0.0 0.0 - 1.5 %    Immature Grans % 0.4 0.0 - 0.5 %    Neutrophils, Absolute 3.35 1.70 - 7.00 10*3/mm3    Lymphocytes, Absolute 1.59 0.70 - 3.10 10*3/mm3    Monocytes, Absolute 0.56 0.10 - 0.90 10*3/mm3    Eosinophils, Absolute 0.05 0.00 - 0.40 10*3/mm3     Basophils, Absolute 0.00 0.00 - 0.20 10*3/mm3    Immature Grans, Absolute 0.02 0.00 - 0.05 10*3/mm3    nRBC 0.0 0.0 - 0.2 /100 WBC   D-dimer, Quantitative    Specimen: Blood   Result Value Ref Range    D-Dimer, Quantitative 0.60 0.00 - 0.76 MCGFEU/mL   Scan Slide    Specimen: Blood   Result Value Ref Range    Macrocytes Mod/2+ None Seen    WBC Morphology Normal Normal    Platelet Morphology Normal Normal   ECG 12 Lead ED Triage Standing Order; SOA   Result Value Ref Range    QT Interval 366 ms    QTC Interval 397 ms   Green Top (Gel)   Result Value Ref Range    Extra Tube Hold for add-ons.    Lavender Top   Result Value Ref Range    Extra Tube hold for add-on    Gold Top - SST   Result Value Ref Range    Extra Tube Hold for add-ons.    Light Blue Top   Result Value Ref Range    Extra Tube Hold for add-ons.         If labs were ordered, I independently reviewed the results and considered them in treating the patient.      EMERGENCY DEPARTMENT COURSE and DIFFERENTIAL DIAGNOSIS/MDM:   Vitals:  AS OF 00:28 EST    BP - 107/67  HR - 70  TEMP - 97.5 °F (36.4 °C) (Oral)  O2 SATS - 99%        Discussion below represents my analysis of pertinent findings related to patient's condition, differential diagnosis, treatment plan and final disposition.      Differential diagnosis:  The differential diagnosis associated with the patient's presentation includes: PE, ACS, pneumonia, pneumothorax, anemia, thrombocytopenia, dehydration, electrolyte derangement      Independent interpretations (ECG/rhythm strip/X-ray/US/CT scan): I independently interpreted the patient's chest x-ray and cardiac monitor.  No evidence of pulmonary infiltrate and the patient is in sinus rhythm.      Additional sources:  Discussed/obtained information from independent historians:   [] Spouse:   [] Parent:   [] Friend:   [] EMS:   [] Other:  External (non-ED) record review:   [] Inpatient record:   [] Office record:   [] Outpatient record:   [x] Prior  Outpatient labs: I reviewed prior labs from earlier this month which revealed hemoglobin of 8.6 and platelet count in the 30s.  I reviewed most recent creatinine from lab check at [facility] on January 9 which revealed a creatinine of 1.2.   [] Prior Outpatient radiology:   [] Primary Care record:   [] Outside ED record:   [] Other:       Patient's care impacted by:   [] Diabetes   [x] Hypertension   [] Coronary Artery Disease   [] Cancer   [x] Other: Myelodysplastic syndrome    Care significantly affected by Social Determinants of Health (housing and economic circumstances, unemployment)    [] Yes     [x] No   If yes, Patient's care significantly limited by  Social Determinants of Health including:    [] Inadequate housing    [] Low income    [] Alcoholism and drug addiction in family    [] Problems related to primary support group    [] Unemployment    [] Problems related to employment    [] Other Social Determinants of Health:       Consideration of admission/observation vs discharge: Patient presents with multiple abnormalities including acute kidney injury and increased thrombocytopenia with associated spontaneous hemorrhage and warrants admission for further evaluation and management.      ED Course:    ED Course as of 01/21/24 0028   Sun Jan 21, 2024   0020 This patient presents with progressive fatigue over the course of the past 2 weeks as well as dyspnea on exertion. She has a history of low-grade myelodysplastic syndrome and was previously seen by hematology here but is currently being followed by hematology [facility] where she lives. She began having some spontaneous epistaxis yesterday. She has no ongoing bleeding today but does have platelet count in the 20s down from the upper 30s on previous measurement, hemoglobin of 7.8 down from 8.6, acute kidney injury with creatinine of 1.7 up from normal measurement on January 9. [NS]   0020 I spoke with hospitalist Dr. Holguin.   Discussed history, presentation, workup.  Accepts patient for admission. [NS]      ED Course User Index  [NS] Ganesh Gordon MD             PROCEDURES:  Procedures    CRITICAL CARE TIME        FINAL IMPRESSION      1. Thrombocytopenia    2. Acute anemia    3. Generalized weakness    4. Epistaxis    5. Acute kidney injury          DISPOSITION/PLAN     ED Disposition       ED Disposition   Decision to Admit    Condition   --    Comment   --                 Comment: Please note this report has been produced using speech recognition software.      Ganesh Gordon MD  Attending Emergency Physician             Ganesh Gordon MD  01/21/24 0029

## 2024-01-21 NOTE — H&P
"    [facility] Medicine Services  HISTORY AND PHYSICAL    Patient Name: Blanca Sparrow  : 1945  MRN: [id number]  Primary Care Physician: Josiah Wagner MD  Date of admission: 2024      Subjective   Subjective     Chief Complaint:  Fatigue, lightheadedness    HPI:  Blanca Sparrow is a 78 y.o. female who states that for the last 2-3 days she has noticed increased fatigue, occasional lightheadedness, and frequent cough with some shortness of breath.  She states the cough and shortness of breath have been there for approximately 2 months, and she was recently seen by another physician and prescribed cefdinir with benzonatate, which she has taken for the last 3 days.  She denies any fever but confirms occasional chills.  She also confirms that yesterday () she had 3 \"very bad nosebleeds\" each of which lasted 2 hours before resolving.  She also notes she had 1 further episode of epistaxis earlier today (), but there has been no recurrence since then.  She has history of myelodysplastic syndrome; this diagnosis is from prior records here, she states she has \"aplastic anemia or MDS, or something,\" stating that she has never been given a specific diagnosis.  She lives in [place] and is followed by physicians at the [facility] for this.  She states she formally did weekly platelet transfusions up until May, but then was told she did not require them anymore, so she has not had any transfusions of any kind since then.  She denies any recent brayden blood in the urine or stool, or any other bleeding issues other than the aforementioned nosebleeds from yesterday and this morning.  Finally, she confirms decreased oral intake of food and fluids over the last 7-10 days, stating she \"cannot taste or smell anything,\" (COVID swab here was negative) and has less appetite.  She denies chest pain, fever, nausea/emesis, abdominal pain, bowel habit change, slurred " speech/facial droop, visual changes, focal weakness, or syncope.  In addition to the myelodysplastic syndrome, her medical history is significant for hypertension and hyperlipidemia.  She also has history of an ascending thoracic aortic aneurysm which is monitored by serial imaging by her physicians in [place].      Personal History     Past Medical History:   Diagnosis Date    Aortic aneurysm, abdominal     Arthritis     GERD (gastroesophageal reflux disease)     High cholesterol     Hypertension          Oncology Problem List:  MDS (myelodysplastic syndrome) (02/18/2022; Status: Active)  Oncology/Hematology History     Past Surgical History:   Procedure Laterality Date    ACHILLES TENDON SURGERY      APPENDECTOMY      BREAST BIOPSY Right     BENIGN CORE BX, PT UNSURE OF DATE    HYSTERECTOMY      POST MENOPAUSAL AGE 60-65    OOPHORECTOMY Bilateral     POST MENOPAUSAL AGE 60-65    OTHER SURGICAL HISTORY      APPEY    SKIN BIOPSY Right 08/2021    Upper Arm    SKIN CANCER EXCISION      TONSILLECTOMY      TOTAL HIP ARTHROPLASTY REVISION Right     TOTAL KNEE ARTHROPLASTY Left        Family History: family history includes Breast cancer (age of onset: 38) in her sister; Breast cancer (age of onset: 50) in her mother; Diabetes in her father; Heart disease in her father and mother; Osteoarthritis in her mother; Ovarian cancer in her maternal grandmother; Stroke in her father.     Social History:  reports that she quit smoking about 41 years ago. Her smoking use included cigarettes. She started smoking about 56 years ago. She has a 13.00 pack-year smoking history. She has never used smokeless tobacco. She reports that she does not drink alcohol and does not use drugs.  Social History     Social History Narrative    Lives in [place], [place]       Medications:  Available home medication information reviewed.  Calcium Citrate-Vitamin D, Cholecalciferol, HYDROcodone-acetaminophen, MG Plus Protein, Olmesartan Medoxomil, amLODIPine,  benzonatate, carvedilol, cefdinir, cetirizine, cycloSPORINE, cycloSPORINE modified, cyclobenzaprine, eltrombopag, montelukast, niacinamide, omeprazole, pravastatin, rosuvastatin, traZODone, venlafaxine XR, vitamin B-12, and vitamin D3    No Known Allergies    Objective   Objective     Vital Signs:   Temp:  [97.5 °F (36.4 °C)] 97.5 °F (36.4 °C)  Heart Rate:  [70-75] 72  Resp:  [16] 16  BP: (103-130)/(67-72) 130/67       Physical Exam   Constitutional: Awake, alert, NAD, very pleasant.  Eyes: PERRLA, sclerae anicteric, no conjunctival injection  HENT: NCAT, mucous membranes dry.  Neck: Supple, no thyromegaly, no lymphadenopathy, trachea midline  Respiratory: Clear to auscultation bilaterally, nonlabored respirations, frequent cough.  Cardiovascular: RRR, no murmurs, rubs, or gallops, palpable pedal pulses bilaterally  Gastrointestinal: Positive but hypoactive bowel sounds, soft, nontender, nondistended  Musculoskeletal: Trace bilateral ankle edema, no clubbing or cyanosis to extremities  Psychiatric: Appropriate affect, cooperative  Neurologic: Oriented x 3, strength symmetric in all extremities, Cranial Nerves grossly intact to confrontation, speech clear  Skin: No rashes, poor turgor.    Result Review:  I have personally reviewed the results from the time of this admission to 1/21/2024 01:31 EST and agree with these findings:  [x]  Laboratory list / accordion  []  Microbiology  [x]  Radiology  [x]  EKG/Telemetry   []  Cardiology/Vascular   []  Pathology  []  Old records  []  Other:  Most notable findings include: I reviewed chest x-ray which by my read shows no infiltrate/edema on this single AP view.  I reviewed EKG which by my read shows normal sinus rhythm, ventricular rate approximately 75 bpm, normal axis, no acute appearing ST/T wave changes.      LAB RESULTS:      Lab 01/20/24  2239   WBC 5.57   HEMOGLOBIN 7.8*   HEMATOCRIT 23.7*   PLATELETS 24*   NEUTROS ABS 3.35   IMMATURE GRANS (ABS) 0.02   LYMPHS ABS 1.59    MONOS ABS 0.56   EOS ABS 0.05   .3*   D DIMER QUANT 0.60         Lab 01/20/24 2239   SODIUM 132*   POTASSIUM 4.2   CHLORIDE 97*   CO2 22.0   ANION GAP 13.0   BUN 28*   CREATININE 1.73*   EGFR 30.3*   GLUCOSE 134*   CALCIUM 9.0         Lab 01/20/24 2239   TOTAL PROTEIN 6.7   ALBUMIN 3.5   GLOBULIN 3.2   ALT (SGPT) 7   AST (SGOT) 13   BILIRUBIN 0.7   ALK PHOS 94         Lab 01/20/24 2239   PROBNP 1,126.0   HSTROP T 23*                     Microbiology Results (last 10 days)       Procedure Component Value - Date/Time    COVID PRE-OP / PRE-PROCEDURE SCREENING ORDER (NO ISOLATION) - Swab, Nasopharynx [[id number]]  (Normal) Collected: 01/20/24 2226    Lab Status: Final result Specimen: Swab from Nasopharynx Updated: 01/20/24 2312    Narrative:      The following orders were created for panel order COVID PRE-OP / PRE-PROCEDURE SCREENING ORDER (NO ISOLATION) - Swab, Nasopharynx.  Procedure                               Abnormality         Status                     ---------                               -----------         ------                     COVID-19 and FLU A/B PCR...[[id number]]  Normal              Final result                 Please view results for these tests on the individual orders.    COVID-19 and FLU A/B PCR, 1 HR TAT - Swab, Nasopharynx [[id number]]  (Normal) Collected: 01/20/24 2226    Lab Status: Final result Specimen: Swab from Nasopharynx Updated: 01/20/24 2312     COVID19 Not Detected     Influenza A PCR Not Detected     Influenza B PCR Not Detected    Narrative:      Fact sheet for providers: https://www.fda.gov/media/437338/download    Fact sheet for patients: https://www.fda.gov/media/492709/download    Test performed by PCR.            XR Chest 1 View    Result Date: 1/20/2024  XR CHEST 1 VW Date of Exam: 1/20/2024 10:20 PM EST Indication: SOA triage protocol Comparison: CT chest 1/16/2023. Chest radiograph 3/9/2018. Findings: There is stable scarring in the right lung base. Left lung  is clear. No new lung opacity. No pneumothorax or pleural effusion. Heart size and pulmonary vasculature appear within normal limits and there are no acute osseous abnormalities.     Impression: Impression: Stable right basilar scarring. No acute cardiopulmonary abnormality. Electronically Signed: Ryan Callejas MD  1/20/2024 10:39 PM EST  Workstation ID: [id number]     Results for orders placed during the hospital encounter of 08/21/18    Adult Transthoracic Echo Complete W/ Cont if Necessary Per Protocol    Interpretation Summary  · Left ventricular diastolic (grade I) consistent with impaired relaxation.  · Left ventricular systolic function is normal. Calculated EF = 66%  · The aortic valve is structurally normal. The valve appears trileaflet. No significant aortic valve regurgitation is present. No aortic valve stenosis is present.  · Mild dilation of the aortic root is present. 3.9 cm. Moderate dilation of the ascending aorta is present. 4.4 cm. Borderline dilation of the aortic arch is present.      Assessment & Plan   Assessment & Plan       Thrombocytopenia      78F with anemia and thrombocytopenia in a patient with known history of MDS, also recent treatment for pneumonia/bronchitis    Anemia  Thrombocytopenia  History of MDS  - Monitor CBC every 6 hours for now.  - Type and screen.  - Continue cyclosporine from home regimen.  - She may continue her Promacta regimen, but this is nonformulary here and will need to be brought in from home, which she states is not a problem.  - Hematology consult requested, input appreciated, will follow up their recommendations.    Bronchitis  - She does not recall if she actually was diagnosed with pneumonia when recently prescribed cefdinir and benzonatate.  - For now, though she has currently clear chest x-ray (no comparison available to see if there was an actual previous pneumonia present), I will continue her cefdinir twice daily for 2 more days to complete a course of 5  days of therapy, though it should be noted that this medication can cause thrombocytopenia and anemia.  - As needed DuoNeb treatments.  - Continue benzonatate for cough as needed.    DONA  - High suspicion for prerenal etiology, given the stated history.  - Light IVF hydration with 0.9 NS.  - Recheck metabolic panel with a.m. labs.  - Check spot urine creatinine, spot urine sodium, spot urine urea nitrogen.    Hypertension  - Continue amlodipine, carvedilol, ARB from home regimen.    Hyperlipidemia  - Continue statin from home regimen.    History of ascending thoracic aortic aneurysm  - Monitored as outpatient.  - Continue BP optimization with home medications for now.        Total time spent: 86 minutes  Time spent includes time reviewing chart, face-to-face time, counseling patient/family/caregiver, ordering medications/tests/procedures, communicating with other health care professionals, documenting clinical information in the electronic health record, and coordination of care.       DVT prophylaxis:  scds      CODE STATUS:  full  Code Status and Medical Interventions:   Ordered at: 01/21/24 0043     Level Of Support Discussed With:    Patient     Code Status (Patient has no pulse and is not breathing):    CPR (Attempt to Resuscitate)     Medical Interventions (Patient has pulse or is breathing):    Full Support       Expected Discharge   tbpaolo Voss III, DO  01/21/24

## 2024-01-21 NOTE — PROGRESS NOTES
[facility] Medicine Services  ADMISSION FOLLOW-UP NOTE          Patient admitted after midnight, H&P by my partner performed earlier on today's date reviewed.  Interim findings, labs, and charting also reviewed.        The [facility] Hospital Problem List has been managed and updated to include any new diagnoses:  Active Hospital Problems    Diagnosis  POA    **Thrombocytopenia [D69.6]  Yes    Symptomatic anemia [D64.9]  Yes    MDS (myelodysplastic syndrome) [D46.9]  Yes      Resolved Hospital Problems   No resolved problems to display.     77 yo F with hx of MDS vs aplastic anemia as well as ascending thoracic aortic aneurysm who presents due to increased fatigue, epistaxis, cough, and dyspnea. She has had URI symptoms for at least 8 weeks. Recently prescribed Cefdinir and Tessalon perles which she has taken for ~3 days. In addition she has had decreased oral intake over the past 1 week. She is found to have worsening anemia and thrombocytopenia. Admitted for further management.    This patient's problems and plans were partially entered by my partner and updated as appropriate by me 01/21/24. Copied text in this note has been reviewed and is accurate as of today's date.     Anemia  Thrombocytopenia  History of MDS vs aplastic anemia  --Hb 7.8 on admission, trending down to 6.8 this morning - 1 unit PRBC ordered for transfusion; certainly could be secondary to acute blood loss from recent epistaxis  --Platelets 24K on admission, stable today at 23K  --Follows with [facility] in [place], gets labs drawn every two weeks - labs available in chart for review: Hb has ranged 8.9-9.4 over the past two months, and platelets 30-40K during same time period; last office note reflects treating for aplastic anemia but also with suspicion for MDS despite bone marrow not reflecting this diagnosis - their transfusion thresholds were platelets <10K or Hct <26%, has not required a platelet  transfusion since May 2023  --Continue cyclosporine from home regimen  --She may continue her Promacta regimen, but this is nonformulary here and will need to be brought in from home  --Hematology consulted for further recommendations: Dr. Taveras recommends continuing to transfuse for Hb >8, platelets >10K, and can follow up in [place] as scheduled if remains stable    Epistaxis  --Currently appears resolved  --Monitor closely for now  --Monitor CBC     Bronchitis  --She does not recall if she actually was diagnosed with pneumonia when recently prescribed Cefdinir and Tessalon perles  --Currently clear CXR (no comparison available to see if there was an actual previous pneumonia present), will continue her Cefdinir twice daily for 2 more days to complete a course of 5 days of therapy, though it should be noted that this medication can cause thrombocytopenia and anemia  --Negative COVID/Flu PCR  --Negative D-dimer, defer CTA chest  --Not hypoxic currently  --PRN DuoNebs     DONA  --High suspicion for prerenal etiology, given 1 week history of decreased PO intake  --Continue IV fluids and monitor for improvement  --Previous normal baseline Cr per outside labs      Hypertension  --Will continue home Coreg  --Hold Amlodipine, ARB from home regimen for now as BP not running that high; resume as tolerated     Hyperlipidemia  --Continue statin     History of ascending thoracic aortic aneurysm  --Monitored as outpatient in [place]  --Continue BP optimization with home medications for now       Expected Discharge   Expected Discharge Date: 1/23/2024; Expected Discharge Time:      Mckenna Jimenez MD  01/21/24

## 2024-01-22 VITALS
BODY MASS INDEX: 31.55 KG/M2 | OXYGEN SATURATION: 98 % | DIASTOLIC BLOOD PRESSURE: 78 MMHG | HEART RATE: 81 BPM | SYSTOLIC BLOOD PRESSURE: 133 MMHG | RESPIRATION RATE: 18 BRPM | TEMPERATURE: 98 F | WEIGHT: 201 LBS | HEIGHT: 67 IN

## 2024-01-22 LAB
ANION GAP SERPL CALCULATED.3IONS-SCNC: 9 MMOL/L (ref 5–15)
BASOPHILS # BLD AUTO: 0 10*3/MM3 (ref 0–0.2)
BASOPHILS NFR BLD AUTO: 0 % (ref 0–1.5)
BH BB BLOOD EXPIRATION DATE: NORMAL
BH BB BLOOD TYPE BARCODE: 5100
BH BB DISPENSE STATUS: NORMAL
BH BB PRODUCT CODE: NORMAL
BH BB UNIT NUMBER: NORMAL
BUN SERPL-MCNC: 20 MG/DL (ref 8–23)
BUN/CREAT SERPL: 20.2 (ref 7–25)
CALCIUM SPEC-SCNC: 8.3 MG/DL (ref 8.6–10.5)
CHLORIDE SERPL-SCNC: 109 MMOL/L (ref 98–107)
CO2 SERPL-SCNC: 23 MMOL/L (ref 22–29)
CREAT SERPL-MCNC: 0.99 MG/DL (ref 0.57–1)
CROSSMATCH INTERPRETATION: NORMAL
DEPRECATED RDW RBC AUTO: 71.7 FL (ref 37–54)
DEPRECATED RDW RBC AUTO: 72.2 FL (ref 37–54)
EGFRCR SERPLBLD CKD-EPI 2021: 59.2 ML/MIN/1.73
EOSINOPHIL # BLD AUTO: 0.06 10*3/MM3 (ref 0–0.4)
EOSINOPHIL NFR BLD AUTO: 1.3 % (ref 0.3–6.2)
ERYTHROCYTE [DISTWIDTH] IN BLOOD BY AUTOMATED COUNT: 18 % (ref 12.3–15.4)
ERYTHROCYTE [DISTWIDTH] IN BLOOD BY AUTOMATED COUNT: 18.2 % (ref 12.3–15.4)
GLUCOSE SERPL-MCNC: 101 MG/DL (ref 65–99)
HCT VFR BLD AUTO: 24 % (ref 34–46.6)
HCT VFR BLD AUTO: 24.3 % (ref 34–46.6)
HCT VFR BLD AUTO: 25.4 % (ref 34–46.6)
HGB BLD-MCNC: 7.9 G/DL (ref 12–15.9)
HGB BLD-MCNC: 8.3 G/DL (ref 12–15.9)
HGB BLD-MCNC: 8.4 G/DL (ref 12–15.9)
IMM GRANULOCYTES # BLD AUTO: 0.05 10*3/MM3 (ref 0–0.05)
IMM GRANULOCYTES NFR BLD AUTO: 1.1 % (ref 0–0.5)
LYMPHOCYTES # BLD AUTO: 2.35 10*3/MM3 (ref 0.7–3.1)
LYMPHOCYTES NFR BLD AUTO: 50 % (ref 19.6–45.3)
MCH RBC QN AUTO: 35.7 PG (ref 26.6–33)
MCH RBC QN AUTO: 35.7 PG (ref 26.6–33)
MCHC RBC AUTO-ENTMCNC: 32.9 G/DL (ref 31.5–35.7)
MCHC RBC AUTO-ENTMCNC: 33.1 G/DL (ref 31.5–35.7)
MCV RBC AUTO: 108.1 FL (ref 79–97)
MCV RBC AUTO: 108.6 FL (ref 79–97)
MONOCYTES # BLD AUTO: 0.37 10*3/MM3 (ref 0.1–0.9)
MONOCYTES NFR BLD AUTO: 7.9 % (ref 5–12)
NEUTROPHILS NFR BLD AUTO: 1.87 10*3/MM3 (ref 1.7–7)
NEUTROPHILS NFR BLD AUTO: 39.7 % (ref 42.7–76)
NRBC BLD AUTO-RTO: 0 /100 WBC (ref 0–0.2)
PLATELET # BLD AUTO: 21 10*3/MM3 (ref 140–450)
PLATELET # BLD AUTO: 24 10*3/MM3 (ref 140–450)
PMV BLD AUTO: 11.2 FL (ref 6–12)
PMV BLD AUTO: 12.1 FL (ref 6–12)
POTASSIUM SERPL-SCNC: 4.6 MMOL/L (ref 3.5–5.2)
RBC # BLD AUTO: 2.21 10*6/MM3 (ref 3.77–5.28)
RBC # BLD AUTO: 2.35 10*6/MM3 (ref 3.77–5.28)
SODIUM SERPL-SCNC: 141 MMOL/L (ref 136–145)
UNIT  ABO: NORMAL
UNIT  RH: NORMAL
WBC NRBC COR # BLD AUTO: 4.7 10*3/MM3 (ref 3.4–10.8)
WBC NRBC COR # BLD AUTO: 5.1 10*3/MM3 (ref 3.4–10.8)

## 2024-01-22 PROCEDURE — 80048 BASIC METABOLIC PNL TOTAL CA: CPT | Performed by: HOSPITALIST

## 2024-01-22 PROCEDURE — 94799 UNLISTED PULMONARY SVC/PX: CPT

## 2024-01-22 PROCEDURE — 85027 COMPLETE CBC AUTOMATED: CPT | Performed by: HOSPITALIST

## 2024-01-22 PROCEDURE — 85014 HEMATOCRIT: CPT | Performed by: INTERNAL MEDICINE

## 2024-01-22 PROCEDURE — 94664 DEMO&/EVAL PT USE INHALER: CPT

## 2024-01-22 PROCEDURE — 36430 TRANSFUSION BLD/BLD COMPNT: CPT

## 2024-01-22 PROCEDURE — 25810000003 SODIUM CHLORIDE 0.9 % SOLUTION: Performed by: INTERNAL MEDICINE

## 2024-01-22 PROCEDURE — 85018 HEMOGLOBIN: CPT | Performed by: INTERNAL MEDICINE

## 2024-01-22 PROCEDURE — 85025 COMPLETE CBC W/AUTO DIFF WBC: CPT | Performed by: HOSPITALIST

## 2024-01-22 PROCEDURE — 99238 HOSP IP/OBS DSCHRG MGMT 30/<: CPT | Performed by: HOSPITALIST

## 2024-01-22 PROCEDURE — P9100 PATHOGEN TEST FOR PLATELETS: HCPCS

## 2024-01-22 PROCEDURE — 99232 SBSQ HOSP IP/OBS MODERATE 35: CPT | Performed by: INTERNAL MEDICINE

## 2024-01-22 PROCEDURE — 63710000001 CYCLOSPORINE PER 100 MG: Performed by: INTERNAL MEDICINE

## 2024-01-22 PROCEDURE — G0378 HOSPITAL OBSERVATION PER HR: HCPCS

## 2024-01-22 PROCEDURE — P9035 PLATELET PHERES LEUKOREDUCED: HCPCS

## 2024-01-22 RX ORDER — ALBUTEROL SULFATE 90 UG/1
2 AEROSOL, METERED RESPIRATORY (INHALATION) EVERY 4 HOURS PRN
Qty: 8.5 G | Refills: 0 | Status: SHIPPED | OUTPATIENT
Start: 2024-01-22

## 2024-01-22 RX ADMIN — Medication 10 ML: at 08:36

## 2024-01-22 RX ADMIN — IPRATROPIUM BROMIDE AND ALBUTEROL SULFATE 3 ML: 2.5; .5 SOLUTION RESPIRATORY (INHALATION) at 08:25

## 2024-01-22 RX ADMIN — CEFDINIR 300 MG: 300 CAPSULE ORAL at 08:35

## 2024-01-22 RX ADMIN — CARVEDILOL 6.25 MG: 6.25 TABLET, FILM COATED ORAL at 08:35

## 2024-01-22 RX ADMIN — CYCLOSPORINE 200 MG: 100 CAPSULE, LIQUID FILLED ORAL at 08:36

## 2024-01-22 RX ADMIN — ROSUVASTATIN 10 MG: 10 TABLET, FILM COATED ORAL at 08:35

## 2024-01-22 RX ADMIN — SODIUM CHLORIDE 100 ML/HR: 9 INJECTION, SOLUTION INTRAVENOUS at 08:41

## 2024-01-22 RX ADMIN — IPRATROPIUM BROMIDE AND ALBUTEROL SULFATE 3 ML: 2.5; .5 SOLUTION RESPIRATORY (INHALATION) at 12:19

## 2024-01-22 RX ADMIN — IPRATROPIUM BROMIDE AND ALBUTEROL SULFATE 3 ML: 2.5; .5 SOLUTION RESPIRATORY (INHALATION) at 01:45

## 2024-01-22 RX ADMIN — PANTOPRAZOLE SODIUM 40 MG: 40 TABLET, DELAYED RELEASE ORAL at 04:34

## 2024-01-22 NOTE — CASE MANAGEMENT/SOCIAL WORK
Continued Stay Note  [facility]     Patient Name: Blanca Sparrow  MRN: [id number]  Today's Date: 1/22/2024    Admit Date: 1/20/2024    Plan: home   Discharge Plan       Row Name 01/22/24 1206       Plan    Plan home    Patient/Family in Agreement with Plan yes    Plan Comments I met with the neice of this patient regarding her discharge home to the neice's house today. They are in agreement with this plan. The neice will transport. They denied having any further discharge needs at this time.    Final Discharge Disposition Code 01 - home or self-care                   Discharge Codes    No documentation.                 Expected Discharge Date and Time       Expected Discharge Date Expected Discharge Time    Jan 22, 2024               Lurdes Solano RN

## 2024-01-22 NOTE — PROGRESS NOTES
HEMATOLOGY/ONCOLOGY PROGRESS NOTE    Subjective      CC: Anemia and thrombocytopenia    SUBJECTIVE:   No acute events overnight.  Patient resting comfortably in bed this morning.  Notes that her cough is improving and is able to speak slightly better.  Does note that she has lost her sense of taste and smell.  Denies any fevers or chills        Past Medical History, Past Surgical History, Social History, Family History have been reviewed and are without significant changes except as mentioned.      Medications:  The current medication list was reviewed in the EMR    ALLERGIES: No Known Allergies    ROS:  A comprehensive 10 point review of systems was performed and was negative except as mentioned.      Objective      Vitals:    01/21/24 2238 01/22/24 0145 01/22/24 0526 01/22/24 0825   BP: 120/74  116/62    BP Location: Left arm  Right arm    Patient Position: Lying  Lying    Pulse: 78 80 72 76   Resp: 18 18 18 18   Temp: 97.1 °F (36.2 °C)  97.6 °F (36.4 °C)    TempSrc: Oral  Oral    SpO2: 98% 90% 94% 97%   Weight:       Height:              General: Laying in bed, in no acute distress  HEENT: sclerae anicteric, oropharynx clear  Lymphatics: no cervical, supraclavicular, inguinal, or axillary adenopathy  Cardiovascular: regular rate and rhythm, no murmurs  Lungs: clear to auscultation bilaterally  Abdomen: soft, nontender, nondistended.  No palpable organomegaly  Extremities: no lower extremity edema  Skin: no rashes, lesions, bruising, or petechiae  Neuro: Alert and oriented x 3. Moves all extremities.    RECENT LABS:    Results from last 7 days   Lab Units 01/22/24  0702 01/22/24  0015 01/21/24  1759 01/21/24  0549 01/21/24 0325 01/20/24 2239   WBC 10*3/mm3 4.70  --   --   --  5.11 5.57   HEMOGLOBIN g/dL 7.9* 8.3* 8.2*   < > 7.1* 7.8*   PLATELETS 10*3/mm3 21*  --   --   --  23* 24*    < > = values in this interval not displayed.     Results from last 7 days   Lab Units 01/22/24  0633 01/21/24  0325 01/20/24 2239    SODIUM mmol/L 141 133* 132*   POTASSIUM mmol/L 4.6 4.2 4.2   CO2 mmol/L 23.0 21.0* 22.0   BUN mg/dL 20 28* 28*   CREATININE mg/dL 0.99 1.67* 1.73*   GLUCOSE mg/dL 101* 112* 134*     Results from last 7 days   Lab Units 01/21/24  0325 01/20/24  2239   AST (SGOT) U/L 11 13   ALT (SGPT) U/L 6 7   BILIRUBIN mg/dL 0.6 0.7   ALK PHOS U/L 83 94         XR Chest 1 View    Result Date: 1/20/2024  Impression: Stable right basilar scarring. No acute cardiopulmonary abnormality. Electronically Signed: Ryan Callejas MD  1/20/2024 10:39 PM EST  Workstation ID: [id number]         Assessment   ASSESSMENT & PLAN:  Anemia and thrombocytopenia  -Likely secondary to bone marrow failure syndrome like MDS being further worsened by any acute onset viral illness  -Currently on cyclosporine and Promacta per her hematologist at [facility]  -Okay to resume home cyclosporine and Promacta dosing  -Status post 1 unit PRBC on 1/21/2024  -Platelet count overall stable at 21K however will plan for 1 unit of platelets today  -Okay from an oncologic standpoint for discharge after her platelet transfusion today  -Patient to follow-up with her primary hematologist at [facility] as scheduled    Braydon Espinal MD  Hematology and Oncology    1/22/2024  08:38 EST

## 2024-01-22 NOTE — CASE MANAGEMENT/SOCIAL WORK
Discharge Planning Assessment  [facility]     Patient Name: Blanca Sparrow  MRN: [id number]  Today's Date: 1/22/2024    Admit Date: 1/20/2024    Plan: home   Discharge Needs Assessment       Row Name 01/22/24 0808       Living Environment    People in Home alone    Unique Family Situation son lives 1 block away in [place], [place]    Current Living Arrangements home    Primary Care Provided by self       Transition Planning    Patient/Family Anticipates Transition to home       Discharge Needs Assessment    Readmission Within the Last 30 Days no previous admission in last 30 days    Equipment Currently Used at Home cpap    Concerns to be Addressed discharge planning;basic needs                   Discharge Plan       Row Name 01/22/24 0809       Plan    Plan home    Patient/Family in Agreement with Plan yes    Plan Comments I met with this patient's neice at the bedside as the patient was asleep. She stated that the patient is visiting her from [place], [place]. She lives alone in [facility]. She is independent with activities of daily living except for housekeeping. She is independent with mobility. She does use a cpap at home and has a traveling cpap. She anticipates that the patient will be discharged back to nemarcela's house at discharge, and the neice can transport. Case management will continue to follow her plan of care and assist with any discharge planning needs.    Final Discharge Disposition Code 01 - home or self-care                  Continued Care and Services - Admitted Since 1/20/2024    Coordination has not been started for this encounter.       Expected Discharge Date and Time       Expected Discharge Date Expected Discharge Time    Jan 23, 2024            Demographic Summary       Row Name 01/22/24 0807       General Information    General Information Comments I confirmed that Josiah Wagner is Ms Sparrow' PCP and she has [facility] insurance                   Functional Status       Row Name  01/22/24 0808       Functional Status, IADL    Medications independent    Meal Preparation independent    Housekeeping completely dependent    Laundry independent    Shopping independent                   Psychosocial    No documentation.                  Abuse/Neglect    No documentation.                  Legal    No documentation.                  Substance Abuse    No documentation.                  Patient Forms    No documentation.                     Lurdes Solano RN

## 2024-01-22 NOTE — DISCHARGE SUMMARY
[facility]  DISCHARGE SUMMARY    Patient Name: Blanca Sparrow  : 1945  MRN: [id number]    Date of Admission: 2024 10:00 PM  Date of Discharge:  24  Primary Care Physician: Josiah Wagner MD    Consults       Date and Time Order Name Status Description    2024  6:05 AM Inpatient Hematology & Oncology Consult Completed             Hospital Course     Presenting Problem: fatigue, epistaxis    Active Hospital Problems    Diagnosis  POA    **Thrombocytopenia [D69.6]  Yes    Symptomatic anemia [D64.9]  Yes    MDS (myelodysplastic syndrome) [D46.9]  Yes      Resolved Hospital Problems   No resolved problems to display.          Hospital Course:  Blanca Sparrow is a 78 y.o. female with hx of MDS vs aplastic anemia as well as ascending thoracic aortic aneurysm who presents due to increased fatigue, epistaxis, cough, and dyspnea. She has had URI symptoms for at least 8 weeks. Recently prescribed Cefdinir and Tessalon perles which she has taken for ~3 days. In addition she has had decreased oral intake over the past 1 week. She is found to have worsening anemia and thrombocytopenia. Admitted for further management.     Anemia  Thrombocytopenia  History of MDS vs aplastic anemia  --Hb 7.8 on admission, trending down to 6.8 - s/p 1 unit PRBC transfusion on 24; certainly could be secondary to acute blood loss from recent epistaxis  --Platelets 24K on admission, trending down to 21K  --Follows with [facility] in [place], gets labs drawn every two weeks - labs available in chart for review: Hb has ranged 8.9-9.4 over the past two months, and platelets 30-40K during same time period; last office note reflects treating for aplastic anemia but also with suspicion for MDS despite bone marrow not reflecting this diagnosis - their transfusion thresholds were platelets <10K or Hct <26%, has not required a platelet transfusion since May  "2023  --Continue cyclosporine and Promacta from home regimen  --Hematology consulted for further recommendations: Dr. Espinal recommended 1 unit platelet transfusion today for a \"boost\" and can follow up in [place] as scheduled next week  --Counts likely temporarily worsening by acute onset viral illness     Epistaxis, resolved  --Currently appears resolved     Bronchitis, improving  --She does not recall if she actually was diagnosed with pneumonia when recently prescribed Cefdinir and Tessalon perles  --Currently clear CXR (no comparison available to see if there was an actual previous pneumonia present), will continue her Cefdinir twice daily to complete course of therapy as previously prescribed, though it should be noted that this medication can cause thrombocytopenia and anemia  --Negative COVID/Flu PCR  --Negative D-dimer, defer CTA chest  --Not hypoxic   --PRN DuoNebs - will discharge with PRN albuterol inhaler     DONA, resolved  --High suspicion for prerenal etiology, given 1 week history of decreased PO intake  --Previous normal baseline Cr per outside labs   --s/p IV fluids with improvement     Hypertension  --Will continue home meds     Hyperlipidemia  --Continue statin     History of ascending thoracic aortic aneurysm  --Monitored as outpatient in [place]  --Continue BP optimization with home medications for now      Discharge Follow Up Recommendations for outpatient labs/diagnostics:  F/U with PCP as needed  F/U with Hem/Onc in [place], [place] next week as scheduled    Day of Discharge     HPI:   Seen this morning. Feels better overall. Cough is less frequent.       Vital Signs:   Temp:  [97.1 °F (36.2 °C)-98.2 °F (36.8 °C)] 98 °F (36.7 °C)  Heart Rate:  [70-81] 70  Resp:  [18] 18  BP: (110-133)/(62-81) 133/78      Physical Exam:  Gen-no acute distress  HENT-NCAT, mucous membranes moist  CV-RRR, S1 S2 normal, no m/r/g  Resp-CTAB mostly however slight end expiratory wheezes appreciated, nonlabored on room " air   Abd-soft, NT, ND, +BS  Ext-no edema  Neuro-A&Ox3, no focal deficits  Skin-no rashes  Psych-appropriate mood      Pertinent  and/or Most Recent Results     LAB RESULTS:      Lab 01/22/24  1033 01/22/24  0702 01/22/24  0015 01/21/24  1759 01/21/24  1141 01/21/24  0549 01/21/24  0325 01/20/24  2239   WBC 5.10 4.70  --   --   --   --  5.11 5.57   HEMOGLOBIN 8.4* 7.9* 8.3* 8.2* 7.8*   < > 7.1* 7.8*   HEMATOCRIT 25.4* 24.0* 24.3* 25.2* 22.9*   < > 22.1* 23.7*   PLATELETS 24* 21*  --   --   --   --  23* 24*   NEUTROS ABS  --  1.87  --   --   --   --  2.60 3.35   IMMATURE GRANS (ABS)  --  0.05  --   --   --   --  0.03 0.02   LYMPHS ABS  --  2.35  --   --   --   --  1.97 1.59   MONOS ABS  --  0.37  --   --   --   --  0.46 0.56   EOS ABS  --  0.06  --   --   --   --  0.04 0.05   .1* 108.6*  --   --   --   --  114.5* 112.3*   PROTIME  --   --   --   --   --   --  14.7*  --    D DIMER QUANT  --   --   --   --   --   --   --  0.60    < > = values in this interval not displayed.         Lab 01/22/24  0633 01/21/24  0325 01/20/24  2239   SODIUM 141 133* 132*   POTASSIUM 4.6 4.2 4.2   CHLORIDE 109* 100 97*   CO2 23.0 21.0* 22.0   ANION GAP 9.0 12.0 13.0   BUN 20 28* 28*   CREATININE 0.99 1.67* 1.73*   EGFR 59.2* 31.6* 30.3*   GLUCOSE 101* 112* 134*   CALCIUM 8.3* 8.8 9.0   MAGNESIUM  --  1.6  --          Lab 01/21/24  0325 01/20/24  2239   TOTAL PROTEIN 6.2 6.7   ALBUMIN 3.2* 3.5   GLOBULIN 3.0 3.2   ALT (SGPT) 6 7   AST (SGOT) 11 13   BILIRUBIN 0.6 0.7   ALK PHOS 83 94         Lab 01/21/24  0325 01/20/24  2239   PROBNP  --  1,126.0   HSTROP T  --  23*   PROTIME 14.7*  --    INR 1.13*  --              Lab 01/21/24 0325   ABO TYPING O   RH TYPING Positive   ANTIBODY SCREEN Negative         Brief Urine Lab Results  (Last result in the past 365 days)        Color   Clarity   Blood   Leuk Est   Nitrite   Protein   CREAT   Urine HCG        01/21/24 0225             180.7               Microbiology Results (last 10 days)        Procedure Component Value - Date/Time    COVID PRE-OP / PRE-PROCEDURE SCREENING ORDER (NO ISOLATION) - Swab, Nasopharynx [[id number]]  (Normal) Collected: 01/20/24 2226    Lab Status: Final result Specimen: Swab from Nasopharynx Updated: 01/20/24 2312    Narrative:      The following orders were created for panel order COVID PRE-OP / PRE-PROCEDURE SCREENING ORDER (NO ISOLATION) - Swab, Nasopharynx.  Procedure                               Abnormality         Status                     ---------                               -----------         ------                     COVID-19 and FLU A/B PCR...[[id number]]  Normal              Final result                 Please view results for these tests on the individual orders.    COVID-19 and FLU A/B PCR, 1 HR TAT - Swab, Nasopharynx [[id number]]  (Normal) Collected: 01/20/24 2226    Lab Status: Final result Specimen: Swab from Nasopharynx Updated: 01/20/24 2312     COVID19 Not Detected     Influenza A PCR Not Detected     Influenza B PCR Not Detected    Narrative:      Fact sheet for providers: https://www.fda.gov/media/454428/download    Fact sheet for patients: https://www.fda.gov/media/221976/download    Test performed by PCR.            XR Chest 1 View    Result Date: 1/20/2024  XR CHEST 1 VW Date of Exam: 1/20/2024 10:20 PM EST Indication: SOA triage protocol Comparison: CT chest 1/16/2023. Chest radiograph 3/9/2018. Findings: There is stable scarring in the right lung base. Left lung is clear. No new lung opacity. No pneumothorax or pleural effusion. Heart size and pulmonary vasculature appear within normal limits and there are no acute osseous abnormalities.     Impression: Stable right basilar scarring. No acute cardiopulmonary abnormality. Electronically Signed: Ryan Callejas MD  1/20/2024 10:39 PM EST  Workstation ID: [id number]             Results for orders placed during the hospital encounter of 08/21/18    Adult Transthoracic Echo Complete W/ Cont if  Necessary Per Protocol    Interpretation Summary  · Left ventricular diastolic (grade I) consistent with impaired relaxation.  · Left ventricular systolic function is normal. Calculated EF = 66%  · The aortic valve is structurally normal. The valve appears trileaflet. No significant aortic valve regurgitation is present. No aortic valve stenosis is present.  · Mild dilation of the aortic root is present. 3.9 cm. Moderate dilation of the ascending aorta is present. 4.4 cm. Borderline dilation of the aortic arch is present.      Discharge Details        Discharge Medications        New Medications        Instructions Start Date   albuterol sulfate  (90 Base) MCG/ACT inhaler  Commonly known as: PROVENTIL HFA;VENTOLIN HFA;PROAIR HFA   2 puffs, Inhalation, Every 4 Hours PRN             Changes to Medications        Instructions Start Date   amLODIPine 5 MG tablet  Commonly known as: NORVASC  What changed: Another medication with the same name was removed. Continue taking this medication, and follow the directions you see here.   Take one tab po daily      cetirizine 10 MG tablet  Commonly known as: zyrTEC  What changed: Another medication with the same name was removed. Continue taking this medication, and follow the directions you see here.   10 mg, Oral, Daily      omeprazole 40 MG capsule  Commonly known as: priLOSEC  What changed: Another medication with the same name was removed. Continue taking this medication, and follow the directions you see here.   1 capsule, Oral, Daily      traZODone 50 MG tablet  Commonly known as: DESYREL  What changed: Another medication with the same name was removed. Continue taking this medication, and follow the directions you see here.   0.5-1 tablets, Oral, Every Night at Bedtime      VITAMIN D-3 PO  What changed: Another medication with the same name was removed. Continue taking this medication, and follow the directions you see here.   5,000 Units, Oral, Daily              Continue These Medications        Instructions Start Date   BENICAR PO   30 mg, Oral, Daily      benzonatate 200 MG capsule  Commonly known as: TESSALON   Take 1 capsule 3 times a day by oral route as needed.      CALCIUM + D PO   Oral, Daily      carvedilol 6.25 MG tablet  Commonly known as: COREG   Take one tab po twice a day      cefdinir 300 MG capsule  Commonly known as: OMNICEF   Take 1 capsule every 12 hours by oral route for 7 days.      cyclobenzaprine 10 MG tablet  Commonly known as: FLEXERIL   10 mg, Oral, 3 Times Daily PRN      cycloSPORINE modified 100 MG capsule  Commonly known as: NEORAL   2 capsules, Oral, Every 12 Hours      HYDROcodone-acetaminophen 5-325 MG per tablet  Commonly known as: NORCO   Take 1 tablet every 6 hours by oral route as needed.      MG Plus Protein 133 MG tablet   133 mg, Oral, 3 Times Daily      montelukast 10 MG tablet  Commonly known as: SINGULAIR   10 mg, Oral, Daily      niacinamide 100 MG tablet   Take 2 tablets every day by oral route.      Promacta 50 MG tablet  Generic drug: eltrombopag       rosuvastatin 10 MG tablet  Commonly known as: CRESTOR   Take 1 tablet every day by oral route at bedtime for 90 days.      venlafaxine  MG 24 hr capsule  Commonly known as: EFFEXOR-XR   150 mg, Oral, Daily      vitamin B-12 100 MCG tablet  Commonly known as: CYANOCOBALAMIN   Vitamin B12   5000 mg po qd             Stop These Medications      cycloSPORINE 100 MG capsule  Commonly known as: sandIMMUNE     pravastatin 40 MG tablet  Commonly known as: PRAVACHOL              No Known Allergies      Discharge Disposition:  Home or Self Care    Diet:  Hospital:  Diet Order   Procedures    Diet: Cardiac Diets; Healthy Heart (2-3 Na+); Texture: Regular Texture (IDDSI 7); Fluid Consistency: Thin (IDDSI 0)       Diet Instructions       Diet: Cardiac Diets; Healthy Heart (2-3 Na+); Regular Texture (IDDSI 7); Thin (IDDSI 0)      Discharge Diet: Cardiac Diets    Cardiac Diet: Healthy  Heart (2-3 Na+)    Texture: Regular Texture (IDDSI 7)    Fluid Consistency: Thin (IDDSI 0)             Activity:  Activity Instructions       Activity as Tolerated                 CODE STATUS:    Code Status and Medical Interventions:   Ordered at: 01/21/24 0043     Level Of Support Discussed With:    Patient     Code Status (Patient has no pulse and is not breathing):    CPR (Attempt to Resuscitate)     Medical Interventions (Patient has pulse or is breathing):    Full Support       No future appointments.    Additional Instructions for the Follow-ups that You Need to Schedule       Discharge Follow-up with PCP   As directed       Currently Documented PCP:    Josiah Wagner MD    PCP Phone Number:    [phone]     Follow Up Details: as needed        Discharge Follow-up with Specified Provider: primary hematologist/oncologist next week as scheduled   As directed      To: primary hematologist/oncologist next week as scheduled                      Mckenna Jimenez MD  01/22/24      Time Spent on Discharge:  I spent  20  minutes on this discharge activity which included: face-to-face encounter with the patient, reviewing the data in the system, coordination of the care with the nursing staff as well as consultants, documentation, and entering orders.

## 2024-01-23 LAB
BH BB BLOOD EXPIRATION DATE: NORMAL
BH BB BLOOD TYPE BARCODE: 6200
BH BB DISPENSE STATUS: NORMAL
BH BB PRODUCT CODE: NORMAL
BH BB UNIT NUMBER: NORMAL
UNIT  ABO: NORMAL
UNIT  RH: NORMAL

## 2024-02-12 LAB
QT INTERVAL: 366 MS
QTC INTERVAL: 397 MS

## 2024-06-03 ENCOUNTER — TRANSCRIBE ORDERS (OUTPATIENT)
Dept: ADMINISTRATIVE | Facility: HOSPITAL | Age: 79
End: 2024-06-03
Payer: COMMERCIAL

## 2024-06-03 DIAGNOSIS — Z12.31 VISIT FOR SCREENING MAMMOGRAM: Primary | ICD-10-CM

## 2024-06-07 ENCOUNTER — HOSPITAL ENCOUNTER (OUTPATIENT)
Dept: MAMMOGRAPHY | Facility: HOSPITAL | Age: 79
Discharge: HOME | End: 2024-06-07
Admitting: INTERNAL MEDICINE
Payer: COMMERCIAL

## 2024-06-07 DIAGNOSIS — Z12.31 VISIT FOR SCREENING MAMMOGRAM: ICD-10-CM

## 2024-06-07 PROCEDURE — 77063 BREAST TOMOSYNTHESIS BI: CPT

## 2024-06-07 PROCEDURE — 77067 SCR MAMMO BI INCL CAD: CPT
